# Patient Record
Sex: FEMALE | Race: WHITE | NOT HISPANIC OR LATINO | Employment: OTHER | URBAN - METROPOLITAN AREA
[De-identification: names, ages, dates, MRNs, and addresses within clinical notes are randomized per-mention and may not be internally consistent; named-entity substitution may affect disease eponyms.]

---

## 2017-02-05 ENCOUNTER — HOSPITAL ENCOUNTER (EMERGENCY)
Facility: CLINIC | Age: 42
Discharge: HOME | End: 2017-02-05
Attending: EMERGENCY MEDICINE | Admitting: EMERGENCY MEDICINE
Payer: COMMERCIAL

## 2017-02-05 ENCOUNTER — APPOINTMENT (OUTPATIENT)
Dept: CT IMAGING | Facility: CLINIC | Age: 42
End: 2017-02-05
Attending: EMERGENCY MEDICINE
Payer: COMMERCIAL

## 2017-02-05 VITALS
HEART RATE: 75 BPM | OXYGEN SATURATION: 99 % | RESPIRATION RATE: 18 BRPM | TEMPERATURE: 98.4 F | BODY MASS INDEX: 24.41 KG/M2 | HEIGHT: 64 IN | WEIGHT: 143 LBS | DIASTOLIC BLOOD PRESSURE: 77 MMHG | SYSTOLIC BLOOD PRESSURE: 116 MMHG

## 2017-02-05 DIAGNOSIS — I77.4: ICD-10-CM

## 2017-02-05 DIAGNOSIS — E87.6 HYPOKALEMIA: ICD-10-CM

## 2017-02-05 DIAGNOSIS — R10.84 ABDOMINAL PAIN, GENERALIZED: ICD-10-CM

## 2017-02-05 LAB
ALBUMIN SERPL-MCNC: 3.8 G/DL (ref 3.4–5)
ALP SERPL-CCNC: 129 U/L (ref 40–150)
ALT SERPL W P-5'-P-CCNC: 22 U/L (ref 0–50)
ANION GAP SERPL CALCULATED.3IONS-SCNC: 9 MMOL/L (ref 3–14)
AST SERPL W P-5'-P-CCNC: 13 U/L (ref 0–45)
BASOPHILS # BLD AUTO: 0.1 10E9/L (ref 0–0.2)
BASOPHILS NFR BLD AUTO: 0.6 %
BILIRUB SERPL-MCNC: 0.3 MG/DL (ref 0.2–1.3)
BUN SERPL-MCNC: 25 MG/DL (ref 7–30)
CALCIUM SERPL-MCNC: 9.2 MG/DL (ref 8.5–10.1)
CHLORIDE SERPL-SCNC: 97 MMOL/L (ref 94–109)
CO2 SERPL-SCNC: 30 MMOL/L (ref 20–32)
CREAT SERPL-MCNC: 0.99 MG/DL (ref 0.52–1.04)
DIFFERENTIAL METHOD BLD: ABNORMAL
EOSINOPHIL # BLD AUTO: 0.4 10E9/L (ref 0–0.7)
EOSINOPHIL NFR BLD AUTO: 5.1 %
ERYTHROCYTE [DISTWIDTH] IN BLOOD BY AUTOMATED COUNT: 12 % (ref 10–15)
GFR SERPL CREATININE-BSD FRML MDRD: 62 ML/MIN/1.7M2
GLUCOSE SERPL-MCNC: 112 MG/DL (ref 70–99)
HCG SERPL QL: NEGATIVE
HCT VFR BLD AUTO: 43.7 % (ref 35–47)
HGB BLD-MCNC: 15.4 G/DL (ref 11.7–15.7)
IMM GRANULOCYTES # BLD: 0 10E9/L (ref 0–0.4)
IMM GRANULOCYTES NFR BLD: 0.2 %
LIPASE SERPL-CCNC: 172 U/L (ref 73–393)
LYMPHOCYTES # BLD AUTO: 3 10E9/L (ref 0.8–5.3)
LYMPHOCYTES NFR BLD AUTO: 35.1 %
MCH RBC QN AUTO: 31.2 PG (ref 26.5–33)
MCHC RBC AUTO-ENTMCNC: 35.2 G/DL (ref 31.5–36.5)
MCV RBC AUTO: 89 FL (ref 78–100)
MONOCYTES # BLD AUTO: 0.8 10E9/L (ref 0–1.3)
MONOCYTES NFR BLD AUTO: 9.2 %
NEUTROPHILS # BLD AUTO: 4.3 10E9/L (ref 1.6–8.3)
NEUTROPHILS NFR BLD AUTO: 49.8 %
NRBC # BLD AUTO: 0 10*3/UL
NRBC BLD AUTO-RTO: 0 /100
PLATELET # BLD AUTO: 500 10E9/L (ref 150–450)
POTASSIUM SERPL-SCNC: 2.7 MMOL/L (ref 3.4–5.3)
PROT SERPL-MCNC: 7.9 G/DL (ref 6.8–8.8)
RBC # BLD AUTO: 4.94 10E12/L (ref 3.8–5.2)
SODIUM SERPL-SCNC: 136 MMOL/L (ref 133–144)
WBC # BLD AUTO: 8.6 10E9/L (ref 4–11)

## 2017-02-05 PROCEDURE — 80053 COMPREHEN METABOLIC PANEL: CPT | Performed by: EMERGENCY MEDICINE

## 2017-02-05 PROCEDURE — 25000125 ZZHC RX 250: Performed by: EMERGENCY MEDICINE

## 2017-02-05 PROCEDURE — 99285 EMERGENCY DEPT VISIT HI MDM: CPT | Mod: 25

## 2017-02-05 PROCEDURE — 96361 HYDRATE IV INFUSION ADD-ON: CPT

## 2017-02-05 PROCEDURE — 96376 TX/PRO/DX INJ SAME DRUG ADON: CPT

## 2017-02-05 PROCEDURE — 83690 ASSAY OF LIPASE: CPT | Performed by: EMERGENCY MEDICINE

## 2017-02-05 PROCEDURE — 25500064 ZZH RX 255 OP 636: Performed by: EMERGENCY MEDICINE

## 2017-02-05 PROCEDURE — 25000128 H RX IP 250 OP 636: Performed by: EMERGENCY MEDICINE

## 2017-02-05 PROCEDURE — 84703 CHORIONIC GONADOTROPIN ASSAY: CPT | Performed by: EMERGENCY MEDICINE

## 2017-02-05 PROCEDURE — 96365 THER/PROPH/DIAG IV INF INIT: CPT | Mod: 59

## 2017-02-05 PROCEDURE — 96375 TX/PRO/DX INJ NEW DRUG ADDON: CPT

## 2017-02-05 PROCEDURE — 74177 CT ABD & PELVIS W/CONTRAST: CPT

## 2017-02-05 PROCEDURE — 85025 COMPLETE CBC W/AUTO DIFF WBC: CPT | Performed by: EMERGENCY MEDICINE

## 2017-02-05 RX ORDER — HYDROMORPHONE HYDROCHLORIDE 1 MG/ML
0.5 INJECTION, SOLUTION INTRAMUSCULAR; INTRAVENOUS; SUBCUTANEOUS
Status: DISCONTINUED | OUTPATIENT
Start: 2017-02-05 | End: 2017-02-05 | Stop reason: HOSPADM

## 2017-02-05 RX ORDER — OXYCODONE AND ACETAMINOPHEN 5; 325 MG/1; MG/1
1-2 TABLET ORAL EVERY 4 HOURS PRN
Qty: 25 TABLET | Refills: 0 | Status: ON HOLD | OUTPATIENT
Start: 2017-02-05 | End: 2017-08-02

## 2017-02-05 RX ORDER — ONDANSETRON 2 MG/ML
4 INJECTION INTRAMUSCULAR; INTRAVENOUS EVERY 30 MIN PRN
Status: DISCONTINUED | OUTPATIENT
Start: 2017-02-05 | End: 2017-02-05 | Stop reason: HOSPADM

## 2017-02-05 RX ORDER — SODIUM CHLORIDE 9 MG/ML
1000 INJECTION, SOLUTION INTRAVENOUS CONTINUOUS
Status: DISCONTINUED | OUTPATIENT
Start: 2017-02-05 | End: 2017-02-05 | Stop reason: HOSPADM

## 2017-02-05 RX ORDER — IOPAMIDOL 755 MG/ML
70 INJECTION, SOLUTION INTRAVASCULAR ONCE
Status: COMPLETED | OUTPATIENT
Start: 2017-02-05 | End: 2017-02-05

## 2017-02-05 RX ORDER — ONDANSETRON 4 MG/1
4 TABLET, ORALLY DISINTEGRATING ORAL EVERY 6 HOURS PRN
Qty: 10 TABLET | Refills: 0 | Status: SHIPPED | OUTPATIENT
Start: 2017-02-05 | End: 2017-02-08

## 2017-02-05 RX ADMIN — HYDROMORPHONE HYDROCHLORIDE 0.5 MG: 1 INJECTION, SOLUTION INTRAMUSCULAR; INTRAVENOUS; SUBCUTANEOUS at 04:59

## 2017-02-05 RX ADMIN — SODIUM CHLORIDE 60 ML: 9 INJECTION, SOLUTION INTRAVENOUS at 05:30

## 2017-02-05 RX ADMIN — SODIUM CHLORIDE 1000 ML: 9 INJECTION, SOLUTION INTRAVENOUS at 04:59

## 2017-02-05 RX ADMIN — POTASSIUM CHLORIDE 10 MEQ: 14.9 INJECTION, SOLUTION, CONCENTRATE PARENTERAL at 05:42

## 2017-02-05 RX ADMIN — IOPAMIDOL 70 ML: 755 INJECTION, SOLUTION INTRAVENOUS at 05:30

## 2017-02-05 RX ADMIN — HYDROMORPHONE HYDROCHLORIDE 0.5 MG: 1 INJECTION, SOLUTION INTRAMUSCULAR; INTRAVENOUS; SUBCUTANEOUS at 05:42

## 2017-02-05 RX ADMIN — ONDANSETRON HYDROCHLORIDE 4 MG: 2 SOLUTION INTRAMUSCULAR; INTRAVENOUS at 04:59

## 2017-02-05 ASSESSMENT — ENCOUNTER SYMPTOMS
VOMITING: 1
HEMATURIA: 0
NAUSEA: 1
ABDOMINAL PAIN: 1

## 2017-02-05 NOTE — ED AVS SNAPSHOT
Emergency Department    [address] [facility] [address]    Phone:  [phone]    Fax:  [phone]                                       Chiqui Arias   MRN: [id number]    Department:   Emergency Department   Date of Visit:  2/5/2017           After Visit Summary Signature Page     I have received my discharge instructions, and my questions have been answered. I have discussed any challenges I see with this plan with the nurse or doctor.    ..........................................................................................................................................  Patient/Patient Representative Signature      ..........................................................................................................................................  Patient Representative Print Name and Relationship to Patient    ..................................................               ................................................  Date                                            Time    ..........................................................................................................................................  Reviewed by Signature/Title    ...................................................              ..............................................  Date                                                            Time

## 2017-02-05 NOTE — ED NOTES
Bed: ED01  Expected date: 2/5/17  Expected time: 4:22 AM  Means of arrival: Ambulance  Comments:  411-40F Abd Pain

## 2017-02-05 NOTE — ED AVS SNAPSHOT
Emergency Department    [address] [facility] [address]    Phone:  [phone]    Fax:  [phone]                                       Chiqui Arias   MRN: [id number]    Department:   Emergency Department   Date of Visit:  2/5/2017           Patient Information     Date Of Birth          1975        Your diagnoses for this visit were:     Abdominal pain, generalized     H/o celiac axis compression syndrome (H)     Hypokalemia        You were seen by Wan Gonzales MD.      Follow-up Information     Schedule an appointment as soon as possible for a visit with Reagan Eaton MD.    Specialty:  Surgery    Why:  or with your vascular surgeon    Contact information:    [facility]  [address] [facility] [address]  [facility] [address] [phone]          Schedule an appointment as soon as possible for a visit with Ladan Scanlon PA-C.    Specialty:  Physician Assistant - Medical    Contact information:    [address] [address] [phone]          Follow up with  Emergency Department.    Specialty:  EMERGENCY MEDICINE    Why:  If symptoms worsen    Contact information:    [address] [facility] [address] [phone]        Discharge Instructions       Discharge Instructions  Abdominal Pain    Abdominal pain can be caused by many things. Your evaluation today does not show the exact cause for your pain. Your doctor today has decided that it is unlikely your pain is due to a life threatening problem, or a problem requiring surgery or hospital admission. Sometimes those problems cannot be found right away, so it is very important that you follow up as directed.  Sometimes only the changes which occur over time allow the cause of your pain to be found.    Return to the Emergency Department for a recheck in 8-12 hours if your pain continues.  If your pain gets worse, changes in location, or feels  different, return to the Emergency Department right away.    ADULTS:  Return to the Emergency Department right away if:      You get an oral temperature above 102oF or as directed by your doctor.    You have blood in your stools (bright red or black, tarry stools).    You keep throwing up or can t drink liquids.    You see blood when you throw up.    You can t have a bowel movement or you can t pass gas.    Your stomach gets bloated or bigger.    Your skin or the whites of your eyes look yellow.    You faint.    You have bloody, frequent or painful urination.    You have new symptoms or anything that worries you.    CHILDREN:  Return to the Emergency Department right away if your child has any of the above-listed symptoms or the following:      Pushes your hand away or screams/cries when his/her belly is touched.    You notice your child is very fussy or weak.    Your child is very tired and is too tired to eat or drink.    Your child is dehydrated.  Signs of dehydration can be:  o Your infant has had no wet diapers in 4-5 hours.  o Your older child has not passed urine in 6-8 hours.  o Your infant or child starts to have dry mouth and lips, or no saliva or tears.    PREGNANT WOMEN:  Return to the Emergency Department right away if you have any of the above-listed symptoms or the following:      You have bleeding, leaking fluid or passing tissue from the vagina.    You have worse pain or cramping, or pain in your shoulder or back.    You have vomiting that will not stop.    You have painful or bloody urination.    You have a temperature of 100oF or more.    Your baby is not moving as much as usual.    You faint.    You get a bad headache with or without eye problems and abdominal pain.    You have a convulsion or seizure.    You have unusual discharge from your vagina and abdominal pain.    Abdominal pain is pretty common during pregnancy.  Your pain may or may not be related to your pregnancy. You should follow-up  "closely with your OB doctor so they can evaluate you and your baby.  Until you follow-up with your regular doctor, do the following:       Avoid sex and do not put anything in your vagina.    Drink clear fluids.    Only take medications approved by your doctor.    MORE INFORMATION:    Appendicitis:  A possible cause of abdominal pain in any person who still has their appendix is acute appendicitis. Appendicitis is often hard to diagnose.  Testing does not always rule out early appendicitis or other causes of abdominal pain. Close follow-up with your doctor and re-evaluations may be needed to figure out the reason for your abdominal pain.    Follow-up:  It is very important that you make an appointment with your clinic and go to the appointment.  If you do not follow-up with your primary doctor, it may result in missing an important development which could result in permanent injury or disability and/or lasting pain.  If there is any problem keeping your appointment, call your doctor or return to the Emergency Department.    Medications:  Take your medications as directed by your doctor today.  Before using over-the-counter medications, ask your doctor and make sure to take the medications as directed.  If you have any questions about medications, ask your doctor.    Diet:  Resume your normal diet as much as possible, but do not eat fried, fatty or spicy foods while you have pain.  Do not drink alcohol or have caffeine.  Do not smoke tobacco.    Probiotics: If you have been given an antibiotic, you may want to also take a probiotic pill or eat yogurt with live cultures. Probiotics have \"good bacteria\" to help your intestines stay healthy. Studies have shown that probiotics help prevent diarrhea and other intestine problems (including C. diff infection) when you take antibiotics. You can buy these without a prescription in the pharmacy section of the store.     If you were given a prescription for medicine here today, " be sure to read all of the information (including the package insert) that comes with your prescription.  This will include important information about the medicine, its side effects, and any warnings that you need to know about.  The pharmacist who fills the prescription can provide more information and answer questions you may have about the medicine.  If you have questions or concerns that the pharmacist cannot address, please call or return to the Emergency Department.         Opioid Medication Information    Pain medications are among the most commonly prescribed medicines, so we are including this information for all our patients. If you did not receive pain medication or get a prescription for pain medicine, you can ignore it.     You may have been given a prescription for an opioid (narcotic) pain medicine and/or have received a pain medicine while here in the Emergency Department. These medicines can make you drowsy or impaired. You must not drive, operate dangerous equipment, or engage in any other dangerous activities while taking these medications. If you drive while taking these medications, you could be arrested for DUI, or driving under the influence. Do not drink any alcohol while you are taking these medications.     Opioid pain medications can cause addiction. If you have a history of chemical dependency of any type, you are at a higher risk of becoming addicted to pain medications.  Only take these prescribed medications to treat your pain when all other options have been tried. Take it for as short a time and as few doses as possible. Store your pain pills in a secure place, as they are frequently stolen and provide a dangerous opportunity for children or visitors in your house to start abusing these powerful medications. We will not replace any lost or stolen medicine.  As soon as your pain is better, you should flush all your remaining medication.     Many prescription pain medications contain  Tylenol  (acetaminophen), including Vicodin , Tylenol #3 , Norco , Lortab , and Percocet .  You should not take any extra pills of Tylenol  if you are using these prescription medications or you can get very sick.  Do not ever take more than 3000 mg of acetaminophen in any 24 hour period.    All opioids tend to cause constipation. Drink plenty of water and eat foods that have a lot of fiber, such as fruits, vegetables, prune juice, apple juice and high fiber cereal.  Take a laxative if you don t move your bowels at least every other day. Miralax , Milk of Magnesia, Colace , or Senna  can be used to keep you regular.      Remember that you can always come back to the Emergency Department if you are not able to see your regular doctor in the amount of time listed above, if you get any new symptoms, or if there is anything that worries you.          Hypokalemia  Hypokalemia means a low level of potassium in the blood. This most often occurs in patients who take diuretics (water pills). It can also occur due to severe vomiting or diarrhea.  It is also seen in people who take laxatives for long periods of time.  A mild case usually causes no symptoms. It is only found with blood testing. More severe potassium loss causes generalized weakness, muscle or abdominal cramping, heart palpitations (rapid or irregular heartbeats) and low blood pressure.  Home care    Take any potassium supplements prescribed.    Eat foods rich in potassium. The highest amount is found in artichoke, baked potatoes, spinach, cantaloupe, honeydew melon, cod, halibut, salmon, and scallops. White, red, or collins beans are also very good sources. A modest amount is found in orange juice, bananas, carrots, and tomato juice.    If you take certain types of diuretics, you will also need to take potassium supplements. If take a diuretic, discuss potassium supplements with your doctor.  Follow-up care  Follow up with your healthcare provider for a repeat  blood test within the next week or as advised by our staff.  When to seek medical advice  Call your healthcare provider if any of the following occur:    Increased weakness, fatigue, muscle cramps    Dizziness  Call 911  Call 911 if any of the following occur:    Irregular heartbeat, extra beats or very fast heart rate    Loss of consciousness    3092-0908 [employer] [address] [place] [address]. All rights reserved. This information is not intended as a substitute for professional medical care. Always follow your healthcare professional's instructions.          24 Hour Appointment Hotline       To make an appointment at any [facility] call [phone] ([phone]). If you don't have a family doctor or clinic, we will help you find one. [facility] are conveniently located to serve the needs of you and your family.             Review of your medicines      START taking        Dose / Directions Last dose taken    ondansetron 4 MG ODT tab   Commonly known as:  ZOFRAN ODT   Dose:  4 mg   Quantity:  10 tablet        Take 1 tablet (4 mg) by mouth every 6 hours as needed for nausea   Refills:  0        oxyCODONE-acetaminophen 5-325 MG per tablet   Commonly known as:  PERCOCET   Dose:  1-2 tablet   Quantity:  25 tablet        Take 1-2 tablets by mouth every 4 hours as needed for pain   Refills:  0          Our records show that you are taking the medicines listed below. If these are incorrect, please call your family doctor or clinic.        Dose / Directions Last dose taken    NO ACTIVE MEDICATIONS        Refills:  0                Prescriptions were sent or printed at these locations (2 Prescriptions)                   Other Prescriptions                Printed at Department/Unit printer (2 of 2)         oxyCODONE-acetaminophen (PERCOCET) 5-325 MG per tablet               ondansetron (ZOFRAN ODT) 4 MG ODT tab                Procedures and tests performed during your visit      CBC with platelets + differential    CT Abdomen Pelvis w Contrast    Comprehensive metabolic panel    HCG qualitative    Lipase      Orders Needing Specimen Collection     None      Pending Results     No orders found from 2/4/2017 to 2/6/2017.            Pending Culture Results     No orders found from 2/4/2017 to 2/6/2017.       Test Results from your hospital stay           2/5/2017  4:49 AM - Interface, Flexilab Results      Component Results     Component Value Ref Range & Units Status    WBC 8.6 4.0 - 11.0 10e9/L Final    RBC Count 4.94 3.8 - 5.2 10e12/L Final    Hemoglobin 15.4 11.7 - 15.7 g/dL Final    Hematocrit 43.7 35.0 - 47.0 % Final    MCV 89 78 - 100 fl Final    MCH 31.2 26.5 - 33.0 pg Final    MCHC 35.2 31.5 - 36.5 g/dL Final    RDW 12.0 10.0 - 15.0 % Final    Platelet Count 500 (H) 150 - 450 10e9/L Final    Diff Method Automated Method  Final    % Neutrophils 49.8 % Final    % Lymphocytes 35.1 % Final    % Monocytes 9.2 % Final    % Eosinophils 5.1 % Final    % Basophils 0.6 % Final    % Immature Granulocytes 0.2 % Final    Nucleated RBCs 0 0 /100 Final    Absolute Neutrophil 4.3 1.6 - 8.3 10e9/L Final    Absolute Lymphocytes 3.0 0.8 - 5.3 10e9/L Final    Absolute Monocytes 0.8 0.0 - 1.3 10e9/L Final    Absolute Eosinophils 0.4 0.0 - 0.7 10e9/L Final    Absolute Basophils 0.1 0.0 - 0.2 10e9/L Final    Abs Immature Granulocytes 0.0 0 - 0.4 10e9/L Final    Absolute Nucleated RBC 0.0  Final         2/5/2017  5:11 AM - Interface, Flexilab Results      Component Results     Component Value Ref Range & Units Status    Sodium 136 133 - 144 mmol/L Final    Potassium 2.7 (L) 3.4 - 5.3 mmol/L Final    Chloride 97 94 - 109 mmol/L Final    Carbon Dioxide 30 20 - 32 mmol/L Final    Anion Gap 9 3 - 14 mmol/L Final    Glucose 112 (H) 70 - 99 mg/dL Final    Urea Nitrogen 25 7 - 30 mg/dL Final    Creatinine 0.99 0.52 - 1.04 mg/dL Final    GFR Estimate 62 >60 mL/min/1.7m2 Final    Non  GFR Calc     GFR Estimate If Black 75 >60 mL/min/1.7m2 Final    African American GFR Calc    Calcium 9.2 8.5 - 10.1 mg/dL Final    Bilirubin Total 0.3 0.2 - 1.3 mg/dL Final    Albumin 3.8 3.4 - 5.0 g/dL Final    Protein Total 7.9 6.8 - 8.8 g/dL Final    Alkaline Phosphatase 129 40 - 150 U/L Final    ALT 22 0 - 50 U/L Final    AST 13 0 - 45 U/L Final         2/5/2017  5:06 AM - Interface, Flexilab Results      Component Results     Component Value Ref Range & Units Status    HCG Qualitative Serum Negative NEG Final         2/5/2017  5:48 AM - Interface, Flexilab Results      Component Results     Component Value Ref Range & Units Status    Lipase 172 73 - 393 U/L Final         2/5/2017  6:48 AM - Interface, Radiant Ib      Narrative     CT ABDOMEN PELVIS WITH CONTRAST   2/5/2017 5:34 AM     HISTORY: Abdominal pain.    TECHNIQUE: 70mL Isovue-370 IV were administered. After contrast  administration, volumetric helical sections were acquired from the  lung bases to the ischial tuberosities. Coronal images were also  reconstructed. Radiation dose for this scan was reduced using  automated exposure control, adjustment of the mA and/or kV according  to patient size, or iterative reconstruction technique.    COMPARISON: None.     FINDINGS: No bowel obstruction. No evidence for colitis or  diverticulitis. No free fluid in the pelvis.  The liver, gallbladder,  spleen, adrenal glands, pancreas, and kidneys are unremarkable. No  hydronephrosis. No enlarged lymph nodes are identified in the abdomen  or pelvis. The appendix is not visualized, consistent with history of  prior appendectomy. The lung bases are clear.        Impression     IMPRESSION: No acute abnormality in the abdomen or pelvis. No cause  for abdominal pain is identified.    LUIS ARMANDO WHITE MD                Clinical Quality Measure: Blood Pressure Screening     Your blood pressure was checked while you were in the emergency department today. The last reading we obtained was   BP: 116/77 mmHg . Please read the guidelines below about what these numbers mean and what you should do about them.  If your systolic blood pressure (the top number) is less than 120 and your diastolic blood pressure (the bottom number) is less than 80, then your blood pressure is normal. There is nothing more that you need to do about it.  If your systolic blood pressure (the top number) is 120-139 or your diastolic blood pressure (the bottom number) is 80-89, your blood pressure may be higher than it should be. You should have your blood pressure rechecked within a year by a primary care provider.  If your systolic blood pressure (the top number) is 140 or greater or your diastolic blood pressure (the bottom number) is 90 or greater, you may have high blood pressure. High blood pressure is treatable, but if left untreated over time it can put you at risk for heart attack, stroke, or kidney failure. You should have your blood pressure rechecked by a primary care provider within the next 4 weeks.  If your provider in the emergency department today gave you specific instructions to follow-up with your doctor or provider even sooner than that, you should follow that instruction and not wait for up to 4 weeks for your follow-up visit.        Thank you for choosing [facility]       Thank you for choosing [facility] for your care. Our goal is always to provide you with excellent care. Hearing back from our patients is one way we can continue to improve our services. Please take a few minutes to complete the written survey that you may receive in the mail after you visit with us. Thank you!        [facility] Information     [employer] gives you secure access to your electronic health record. If you see a primary care provider, you can also send messages to your care team and make appointments. If you have questions, please call your primary care clinic.  If you do not have a primary care provider, please call [phone] and they  will assist you.        Care EveryWhere ID     This is your Care EveryWhere ID. This could be used by other organizations to access your [facility] medical records  [id number]        After Visit Summary       This is your record. Keep this with you and show to your community pharmacist(s) and doctor(s) at your next visit.

## 2017-02-05 NOTE — DISCHARGE INSTRUCTIONS
Discharge Instructions  Abdominal Pain    Abdominal pain can be caused by many things. Your evaluation today does not show the exact cause for your pain. Your doctor today has decided that it is unlikely your pain is due to a life threatening problem, or a problem requiring surgery or hospital admission. Sometimes those problems cannot be found right away, so it is very important that you follow up as directed.  Sometimes only the changes which occur over time allow the cause of your pain to be found.    Return to the Emergency Department for a recheck in 8-12 hours if your pain continues.  If your pain gets worse, changes in location, or feels different, return to the Emergency Department right away.    ADULTS:  Return to the Emergency Department right away if:      You get an oral temperature above 102oF or as directed by your doctor.    You have blood in your stools (bright red or black, tarry stools).    You keep throwing up or can t drink liquids.    You see blood when you throw up.    You can t have a bowel movement or you can t pass gas.    Your stomach gets bloated or bigger.    Your skin or the whites of your eyes look yellow.    You faint.    You have bloody, frequent or painful urination.    You have new symptoms or anything that worries you.    CHILDREN:  Return to the Emergency Department right away if your child has any of the above-listed symptoms or the following:      Pushes your hand away or screams/cries when his/her belly is touched.    You notice your child is very fussy or weak.    Your child is very tired and is too tired to eat or drink.    Your child is dehydrated.  Signs of dehydration can be:  o Your infant has had no wet diapers in 4-5 hours.  o Your older child has not passed urine in 6-8 hours.  o Your infant or child starts to have dry mouth and lips, or no saliva or tears.    PREGNANT WOMEN:  Return to the Emergency Department right away if you have any of the above-listed symptoms or  the following:      You have bleeding, leaking fluid or passing tissue from the vagina.    You have worse pain or cramping, or pain in your shoulder or back.    You have vomiting that will not stop.    You have painful or bloody urination.    You have a temperature of 100oF or more.    Your baby is not moving as much as usual.    You faint.    You get a bad headache with or without eye problems and abdominal pain.    You have a convulsion or seizure.    You have unusual discharge from your vagina and abdominal pain.    Abdominal pain is pretty common during pregnancy.  Your pain may or may not be related to your pregnancy. You should follow-up closely with your OB doctor so they can evaluate you and your baby.  Until you follow-up with your regular doctor, do the following:       Avoid sex and do not put anything in your vagina.    Drink clear fluids.    Only take medications approved by your doctor.    MORE INFORMATION:    Appendicitis:  A possible cause of abdominal pain in any person who still has their appendix is acute appendicitis. Appendicitis is often hard to diagnose.  Testing does not always rule out early appendicitis or other causes of abdominal pain. Close follow-up with your doctor and re-evaluations may be needed to figure out the reason for your abdominal pain.    Follow-up:  It is very important that you make an appointment with your clinic and go to the appointment.  If you do not follow-up with your primary doctor, it may result in missing an important development which could result in permanent injury or disability and/or lasting pain.  If there is any problem keeping your appointment, call your doctor or return to the Emergency Department.    Medications:  Take your medications as directed by your doctor today.  Before using over-the-counter medications, ask your doctor and make sure to take the medications as directed.  If you have any questions about medications, ask your doctor.    Diet:   "Resume your normal diet as much as possible, but do not eat fried, fatty or spicy foods while you have pain.  Do not drink alcohol or have caffeine.  Do not smoke tobacco.    Probiotics: If you have been given an antibiotic, you may want to also take a probiotic pill or eat yogurt with live cultures. Probiotics have \"good bacteria\" to help your intestines stay healthy. Studies have shown that probiotics help prevent diarrhea and other intestine problems (including C. diff infection) when you take antibiotics. You can buy these without a prescription in the pharmacy section of the store.     If you were given a prescription for medicine here today, be sure to read all of the information (including the package insert) that comes with your prescription.  This will include important information about the medicine, its side effects, and any warnings that you need to know about.  The pharmacist who fills the prescription can provide more information and answer questions you may have about the medicine.  If you have questions or concerns that the pharmacist cannot address, please call or return to the Emergency Department.         Opioid Medication Information    Pain medications are among the most commonly prescribed medicines, so we are including this information for all our patients. If you did not receive pain medication or get a prescription for pain medicine, you can ignore it.     You may have been given a prescription for an opioid (narcotic) pain medicine and/or have received a pain medicine while here in the Emergency Department. These medicines can make you drowsy or impaired. You must not drive, operate dangerous equipment, or engage in any other dangerous activities while taking these medications. If you drive while taking these medications, you could be arrested for DUI, or driving under the influence. Do not drink any alcohol while you are taking these medications.     Opioid pain medications can cause " addiction. If you have a history of chemical dependency of any type, you are at a higher risk of becoming addicted to pain medications.  Only take these prescribed medications to treat your pain when all other options have been tried. Take it for as short a time and as few doses as possible. Store your pain pills in a secure place, as they are frequently stolen and provide a dangerous opportunity for children or visitors in your house to start abusing these powerful medications. We will not replace any lost or stolen medicine.  As soon as your pain is better, you should flush all your remaining medication.     Many prescription pain medications contain Tylenol  (acetaminophen), including Vicodin , Tylenol #3 , Norco , Lortab , and Percocet .  You should not take any extra pills of Tylenol  if you are using these prescription medications or you can get very sick.  Do not ever take more than 3000 mg of acetaminophen in any 24 hour period.    All opioids tend to cause constipation. Drink plenty of water and eat foods that have a lot of fiber, such as fruits, vegetables, prune juice, apple juice and high fiber cereal.  Take a laxative if you don t move your bowels at least every other day. Miralax , Milk of Magnesia, Colace , or Senna  can be used to keep you regular.      Remember that you can always come back to the Emergency Department if you are not able to see your regular doctor in the amount of time listed above, if you get any new symptoms, or if there is anything that worries you.          Hypokalemia  Hypokalemia means a low level of potassium in the blood. This most often occurs in patients who take diuretics (water pills). It can also occur due to severe vomiting or diarrhea.  It is also seen in people who take laxatives for long periods of time.  A mild case usually causes no symptoms. It is only found with blood testing. More severe potassium loss causes generalized weakness, muscle or abdominal cramping,  heart palpitations (rapid or irregular heartbeats) and low blood pressure.  Home care    Take any potassium supplements prescribed.    Eat foods rich in potassium. The highest amount is found in artichoke, baked potatoes, spinach, cantaloupe, honeydew melon, cod, halibut, salmon, and scallops. White, red, or collins beans are also very good sources. A modest amount is found in orange juice, bananas, carrots, and tomato juice.    If you take certain types of diuretics, you will also need to take potassium supplements. If take a diuretic, discuss potassium supplements with your doctor.  Follow-up care  Follow up with your healthcare provider for a repeat blood test within the next week or as advised by our staff.  When to seek medical advice  Call your healthcare provider if any of the following occur:    Increased weakness, fatigue, muscle cramps    Dizziness  Call 911  Call 911 if any of the following occur:    Irregular heartbeat, extra beats or very fast heart rate    Loss of consciousness    6482-6594 [employer]. [address] [place] [address]. All rights reserved. This information is not intended as a substitute for professional medical care. Always follow your healthcare professional's instructions.

## 2017-02-05 NOTE — ED PROVIDER NOTES
"  History     Chief Complaint:  Abdominal Pain      HPI   Chiqui Arias is a 41 year old female who presents to the emergency department today for evaluation of abdominal pain. The patient states that she has \"tearing\" epigastric abdominal pain accompanied with nausea and vomiting that started last night. She notes that she had similar pain last summer and was subsequently admitted for \"celiac stenosis of the artery.\" She denies hematuria, fevers, dark or tarry stools, or any other complaints.      Allergies:  Morphine       Medications:    The patient is currently on no regular medications.     Past Medical History:    Chlamydia  Celiac stenosis artery     Past Surgical History:    Appendectomy      Family History:    Mother: Thyroid disease  Father: Heart disease, Melanoma   Paternal Grandmother: Alzheimer's disease      Social History:  The patient presents alone.  Smoking Status: Current every day smoker-- 1.00 packs/day  Smokeless Tobacco: Never used  Alcohol Use: Yes  Marital Status:  Single [1]    Review of Systems   Gastrointestinal: Positive for nausea, vomiting and abdominal pain.        Negative for dark or tarry stool     Genitourinary: Negative for hematuria.   All other systems reviewed and are negative.    Physical Exam   Vitals  Patient Vitals for the past 24 hrs:   BP Temp Temp src Pulse Resp SpO2 Height Weight   02/05/17 0433 (!) 140/104 mmHg 98.4  F (36.9  C) Oral 87 18 98 % 1.619 m (5' 3.75\") 64.864 kg (143 lb)        Physical Exam  Nursing note and vitals reviewed.  Constitutional:  Oriented to person, place, and time. Cooperative.   HENT:   Nose:    Nose normal.   Mouth/Throat:   Mucous membranes are normal.   Eyes:    Conjunctivae normal and EOM are normal.      Pupils are equal, round, and reactive to light.   Neck:    Trachea normal.   Cardiovascular:  Normal rate, regular rhythm, normal heart sounds and normal pulses. No murmur heard.  Pulmonary/Chest:  Effort normal and breath " sounds normal.   Abdominal:   Soft. Normal appearance and bowel sounds are normal.      There is diffuse tenderness to palpation.     There is no rebound and no CVA tenderness.   Musculoskeletal:  Extremities atraumatic x 4.   Lymphadenopathy:  No cervical adenopathy.   Neurological:   Alert and oriented to person, place, and time. Normal strength.      No cranial nerve deficit or sensory deficit. GCS eye subscore is 4. GCS verbal subscore is 5. GCS motor subscore is 6.   Skin:    Skin is intact. No rash noted.   Psychiatric:   Normal mood and affect.    Emergency Department Course   Imaging:  Radiology findings were communicated with the patient who voiced understanding of the findings.    CT Abdomen Pelvis w/ Contrast:  No acute abnormality in the abdomen or pelvis. No cause  for abdominal pain is identified.  Reading per radiology    Laboratory:  Laboratory findings were communicated with the patient who voiced understanding of the findings.    CBC: PLT: 500(H) o/w WNL. (WBC 8.6, HGB 15.4)     CMP: Potassium: 2.7(L), Glucose: 112(H) WNL (Creatinine: 0.99)    HCG qualitative: Negative     Lipase: 172    Interventions:  0459- NS 1000 mL IV  0459- Dilaudid 0.5 mg IV  0542- Dilaudid 0.5 mg IV  0542- potassium chloride 10 mEq IV      Emergency Department Course:  Nursing notes and vitals reviewed.  I performed an exam of the patient as documented above.     IV was inserted and blood was drawn for laboratory testing, results above.    The patient was sent for a CT while in the emergency department, results above.     I discussed the treatment plan with the patient. They expressed understanding of this plan and discharge.    I personally reviewed the laboratory results with the Patient and answered all related questions prior to discharge.    Impression & Plan      Medical Decision Making:  This is a 41 year old female who came in with abdominal pain, nausea, and vomiting. 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 She indicates that her pain today feels similar to the pain she had when she was admitted at [facility] in August of 2015 when that was found. She is being followed by vascular surgery there and they feel that nothing needs to be done. She does not have peritoneal signs and has a relatively normal exam other than some mild diffuse tenderness to palpation. I obtained the above work up here including the blood work and CT scan. While I did not obtain an angiogram of her abdomen, she does not appear to have any significant findings today. Her pain very well could be secondary to the compression though, but she has no worrisome findings for ischemic bowel. Her potassium is on the low side but that is not new for her. We replaced that here as well. I  discussed staying in the hospital for further evaluation and management vs going home. She actually prefers to go home, which I think that is reasonable. However I recommended following up with her vascular surgeon or the one I am providing for her as soon as possible. I will send her home with prescriptions for Percocet and Zofran as well. She should return with increasing pain, vomiting, fevers, or other concerns.         Diagnosis:    ICD-10-CM    1. Abdominal pain, generalized R10.84    2. 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 Hypokalemia E87.6          Disposition:   The patient was discharged.    Discharge Medications:  Discharge Medication List as of 2/5/2017  6:59 AM      START taking these medications    Details   oxyCODONE-acetaminophen (PERCOCET) 5-325 MG per tablet Take 1-2 tablets by mouth every 4 hours as needed for pain, Disp-25 tablet, R-0, Local Print      ondansetron (ZOFRAN ODT) 4 MG ODT tab Take 1 tablet (4 mg) by mouth every 6 hours as needed for nausea, Disp-10 tablet, R-0, Local Print             Scribe  Disclosure:  I, Marlon Núñez, am serving as a scribe at 4:53 AM on 2/5/2017 to document services personally performed by Wan Gonzales MD, based on my observations and the provider's statements to me.    2/5/2017    EMERGENCY DEPARTMENT        Wan Gonzales MD  02/06/17 0331

## 2017-02-22 ENCOUNTER — TELEPHONE (OUTPATIENT)
Dept: OTHER | Facility: CLINIC | Age: 42
End: 2017-02-22

## 2017-02-22 NOTE — TELEPHONE ENCOUNTER
"Late charting. Pt called  and setup OV with Dr. Eaton for 2-23-17.     Pt referred to [facility] by Dr. Gonzales for celiac axis syndrome. (MALS)    ED notes and CT ABDOMEN PELVIS WITH CONTRAST 2/5/2017 in [facility].     Pt seen in ED at [facility] on 2-5-17, Dr. Gonzales notes   \"This is a 41 year old female who came in with abdominal pain, nausea, and vomiting. 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 She indicates that her pain today feels similar to the pain she had when she was admitted at [facility] in August of 2015 when that was found. She is being followed by vascular surgery there and they feel that nothing needs to be done. She does not have peritoneal signs and has a relatively normal exam other than some mild diffuse tenderness to palpation. I obtained the above work up here including the blood work and CT scan. While I did not obtain an angiogram of her abdomen, she does not appear to have any significant findings today. Her pain very well could be secondary to the compression though, but she has no worrisome findings for ischemic bowel. Her potassium is on the low side but that is not new for her. We replaced that here as well. I discussed staying in the hospital for further evaluation and management vs going home. She actually prefers to go home, which I think that is reasonable. However I recommended following up with her vascular surgeon or the one I am providing for her as soon as possible. I will send her home with prescriptions for Percocet and Zofran as well. She should return with increasing pain, vomiting, fevers, or other concerns.\"     Jodi Moreira, PRANEETH, BSN.       "

## 2017-02-23 ENCOUNTER — TELEPHONE (OUTPATIENT)
Dept: OTHER | Facility: CLINIC | Age: 42
End: 2017-02-23

## 2017-02-23 NOTE — TELEPHONE ENCOUNTER
Tried calling pt to inform her that pt missed their appt today with Dr. Eaton but pt's only phone number listed says its unavailable and/or has been disconnected.

## 2017-03-23 ENCOUNTER — OFFICE VISIT (OUTPATIENT)
Dept: OTHER | Facility: CLINIC | Age: 42
End: 2017-03-23
Attending: SURGERY
Payer: COMMERCIAL

## 2017-03-23 VITALS
DIASTOLIC BLOOD PRESSURE: 88 MMHG | WEIGHT: 144 LBS | BODY MASS INDEX: 24.59 KG/M2 | SYSTOLIC BLOOD PRESSURE: 126 MMHG | HEIGHT: 64 IN | HEART RATE: 77 BPM

## 2017-03-23 DIAGNOSIS — I77.4 CELIAC ARTERY COMPRESSION SYNDROME (H): Primary | ICD-10-CM

## 2017-03-23 PROCEDURE — 99203 OFFICE O/P NEW LOW 30 MIN: CPT | Mod: ZP | Performed by: SURGERY

## 2017-03-23 PROCEDURE — 99211 OFF/OP EST MAY X REQ PHY/QHP: CPT

## 2017-03-23 RX ORDER — CHLORTHALIDONE 25 MG/1
25 TABLET ORAL DAILY
COMMUNITY
Start: 2017-01-21

## 2017-03-23 NOTE — NURSING NOTE
"Chief Complaint   Patient presents with     Consult     Pt seen in ED for celiac axis syndrome        Initial /88 (BP Location: Right arm, Patient Position: Chair, Cuff Size: Adult Regular)  Pulse 77  Ht 5' 4\" (1.626 m)  Wt 144 lb (65.3 kg)  BMI 24.72 kg/m2 Estimated body mass index is 24.72 kg/(m^2) as calculated from the following:    Height as of this encounter: 5' 4\" (1.626 m).    Weight as of this encounter: 144 lb (65.3 kg).  Medication Reconciliation: {Medication Reconciliation:[id number]}  "
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
## 2017-03-23 NOTE — NURSING NOTE
"Chief Complaint   Patient presents with     Consult     Pt seen in ED for celiac axis syndrome        Initial /84 (BP Location: Left arm, Patient Position: Chair, Cuff Size: Adult Regular)  Pulse 80  Ht 5' 4\" (1.626 m)  Wt 144 lb (65.3 kg)  BMI 24.72 kg/m2 Estimated body mass index is 24.72 kg/(m^2) as calculated from the following:    Height as of this encounter: 5' 4\" (1.626 m).    Weight as of this encounter: 144 lb (65.3 kg).  Medication Reconciliation: complete     Face to face nursing time: 8 minutes    Kaykay Muñiz MA     "

## 2017-03-23 NOTE — MR AVS SNAPSHOT
"              After Visit Summary   3/23/2017    Chiqui Arias    MRN: [id number]           Patient Information     Date Of Birth          1975        Visit Information        Provider Department      3/23/2017 3:00 PM Reagan Eaton MD [facility]      Today's Diagnoses     Celiac artery compression syndrome (H)    -  1       Follow-ups after your visit        Who to contact     If you have questions or need follow up information about today's clinic visit or your schedule please contact [facility] directly at [phone].  Normal or non-critical lab and imaging results will be communicated to you by MyChart, letter or phone within 4 business days after the clinic has received the results. If you do not hear from us within 7 days, please contact the clinic through [employer] or phone. If you have a critical or abnormal lab result, we will notify you by phone as soon as possible.  Submit refill requests through [employer] or call your pharmacy and they will forward the refill request to us. Please allow 3 business days for your refill to be completed.          Additional Information About Your Visit        MyChart Information     [employer] gives you secure access to your electronic health record. If you see a primary care provider, you can also send messages to your care team and make appointments. If you have questions, please call your primary care clinic.  If you do not have a primary care provider, please call [phone] and they will assist you.        Care EveryWhere ID     This is your Care EveryWhere ID. This could be used by other organizations to access your [facility] medical records  [id number]        Your Vitals Were     Pulse Height Breastfeeding? BMI (Body Mass Index)          77 5' 4\" (1.626 m) No 24.72 kg/m2         Blood Pressure from Last 3 Encounters:   03/23/17 126/88   02/05/17 116/77 "   10/19/12 126/84    Weight from Last 3 Encounters:   03/23/17 144 lb (65.3 kg)   02/05/17 143 lb (64.9 kg)   10/19/12 144 lb 12.8 oz (65.7 kg)              Today, you had the following     No orders found for display       Primary Care Provider Office Phone # Fax #    Ladan Scanlon PA-C [phone]       [facility] [address] [facility] [place] [address]        Thank you!     Thank you for choosing [facility] [place]  for your care. Our goal is always to provide you with excellent care. Hearing back from our patients is one way we can continue to improve our services. Please take a few minutes to complete the written survey that you may receive in the mail after your visit with us. Thank you!             Your Updated Medication List - Protect others around you: Learn how to safely use, store and throw away your medicines at www.disposemymeds.org.          This list is accurate as of: 3/23/17  6:26 PM.  Always use your most recent med list.                   Brand Name Dispense Instructions for use    chlorthalidone 25 MG tablet    HYGROTON     Take 25 mg by mouth       NO ACTIVE MEDICATIONS          oxyCODONE-acetaminophen 5-325 MG per tablet    PERCOCET    25 tablet    Take 1-2 tablets by mouth every 4 hours as needed for pain

## 2017-03-24 DIAGNOSIS — I77.4 CELIAC ARTERY COMPRESSION SYNDROME (H): Primary | ICD-10-CM

## 2017-03-29 ENCOUNTER — TELEPHONE (OUTPATIENT)
Dept: OTHER | Facility: CLINIC | Age: 42
End: 2017-03-29

## 2017-03-29 NOTE — TELEPHONE ENCOUNTER
I called and left a message with Chiqui SIBLEY Nallelyfabiana.  We saw her recently for abdominal pain associated with bicycling.  She had a previous CTA scan at [facility] two years ago implying that she had median arcuate ligament syndrome with compression basilic artery and possibly the SMA.      We did receive the information reviewed the films.  This does show significant compression of the proximal celiac artery with poststenotic dilatation.  There may also be some mild compression of the SMA. I left a message that we had received and reviewed the CTA results and films.      She has very unusual symptoms  For this syndrome which is usually associated with postprandial abdominal pain and loose bowels.  However, she does certainly have significant compression of the celiac artery that could lead in the future if untreated to scar tissue and a chronic problem.  She is scheduled for noninvasive duplex evaluation of celiac artery with inspiration and expiration this coming week.  Once he's tests are available we'll make further recommendations at this time I'm leaning towards an open division of the median arcuate ligament and mobilization of celiac and SMA arteries.      Reagan Eaton MD

## 2017-04-03 ENCOUNTER — HOSPITAL ENCOUNTER (OUTPATIENT)
Dept: ULTRASOUND IMAGING | Facility: CLINIC | Age: 42
Discharge: HOME | End: 2017-04-03
Attending: SURGERY | Admitting: SURGERY
Payer: COMMERCIAL

## 2017-04-03 DIAGNOSIS — I77.4 CELIAC ARTERY COMPRESSION SYNDROME (H): ICD-10-CM

## 2017-04-03 PROCEDURE — 93976 VASCULAR STUDY: CPT

## 2017-04-10 ENCOUNTER — TELEPHONE (OUTPATIENT)
Dept: OTHER | Facility: CLINIC | Age: 42
End: 2017-04-10

## 2017-04-10 NOTE — TELEPHONE ENCOUNTER
I tried to call Chiqui Arias And left the brief message on her home answering machine.  She had seen me recently for epigastric pain.  She had a previous diagnosis of compression ot the upper  Proximal celiac artery and SMA artery from a CTA from [facility]  Indicative of MALS.    Her symptoms are somewhat atypical since this is not associated with eating but more exercise such as riding her bike to and from work.  I was able to obtain the CTA disc and review these findings which are consistent with MALS primarily involving the celiac artery and to a slight degree the SMA which are otherwise completely normal.    We performed a dynamic duplex ultrasound which confirmed compression of these arteries with very little respiratory variation.  This implies that the median arcuate ligament may have a chronic compression of the artery and not just with respiration which is oftentimes the situation.      With these findings open surgical decompression and division of the arcuate ligament would be the only treatment option.  I left a message for her to call us to discuss this further.       Reagan Eaton MD

## 2017-04-12 ENCOUNTER — TELEPHONE (OUTPATIENT)
Dept: OTHER | Facility: CLINIC | Age: 42
End: 2017-04-12

## 2017-04-12 NOTE — TELEPHONE ENCOUNTER
I tried to reach Chiqui Arias  Multiple times on her phone which is the only available contact.  We have left messages in the past to call us.  Presently her phone number is not accepting any messages.         Reagan Eaton MD

## 2017-04-13 ENCOUNTER — TELEPHONE (OUTPATIENT)
Dept: OTHER | Facility: CLINIC | Age: 42
End: 2017-04-13

## 2017-04-13 NOTE — TELEPHONE ENCOUNTER
Chiqui SIBLEY Juana Did call back in speak with me today after we had left several messages.  She has evidence of significant fixed compression of the celiac artery and some degree of the SMA it appears to be very chronic and noticed several years ago a CTA.  This is not affected by respiratory changes on a recent duplex ultrasound.  This likely would explain the discomfort she is having chronically her upper abdomen particularly with exercise below this is an atypical finding.    This probably will not resolve with any conservative treatment.  I'm also concerned that ongoing long-term compression particularly this iliac artery may lead to fibrosis and scarring of the artery may be permanent and not relieved with release of the median arcuate ligament extrinsic compression.    I does feel that she would benefit from a open mobilization of the celiac and SMA arteries with division of median arcuate ligament.  If a fixed obstruction is noted we would consider an interoperative bypass from the aorta to the normal celiac artery. This would unlikely be the situation for the SMA considering the prior CTA views.    I have discussed this with her today about the surgical procedure and recovery.  She wants to discuss this with her mother before making a final decision.  She will call Andreina Moore my scheduling staff to schedule surgery.       Reagan Eaton MD

## 2017-04-18 ENCOUNTER — TELEPHONE (OUTPATIENT)
Dept: OTHER | Facility: CLINIC | Age: 42
End: 2017-04-18

## 2017-05-04 ENCOUNTER — OFFICE VISIT (OUTPATIENT)
Dept: OTHER | Facility: CLINIC | Age: 42
End: 2017-05-04
Attending: SURGERY
Payer: COMMERCIAL

## 2017-05-04 VITALS — DIASTOLIC BLOOD PRESSURE: 89 MMHG | SYSTOLIC BLOOD PRESSURE: 125 MMHG | HEART RATE: 97 BPM

## 2017-05-04 DIAGNOSIS — I77.4 MEDIAN ARCUATE LIGAMENT SYNDROME (H): Primary | ICD-10-CM

## 2017-05-04 PROCEDURE — 99213 OFFICE O/P EST LOW 20 MIN: CPT | Mod: ZP | Performed by: SURGERY

## 2017-05-04 PROCEDURE — 99211 OFF/OP EST MAY X REQ PHY/QHP: CPT

## 2017-05-04 NOTE — LETTER
[facility] [place] [address]  [place] [address]  Phone: [phone]  Fax: [phone]      May 4, 2017      RE:  Chiqui Arias-:  77    Chiqui Arias came in today for reevaluation. She has a long history of abdominal discomfort. This can be associated with activities. Occasionally this is associated with eating meals but less so with smaller portions. She can have a pain even with no activity or eating.      We were able to review a CT scan from [facility] performed on 2015. This revealed significant compression of the proximal celiac artery and even of superior mesenteric artery (total left her degree). On the celiac artery poststenotic dilatation was appreciated. No other vascular problems were appreciated in the aorta-renal arteries.     Though her symptoms are very vague and not classic for median arcuate ligament syndrome her CT findings are very dramatic. On our duplex evaluation it was no respiratory variation implying the compression was constant. I am concerned that if this is not corrected she will develop if she has not already a permanent narrowing upper celiac artery and possibly using the superior mesenteric artery which could lead to potential intestinal ischemia symptoms.     With these findings would recommend she undergo surgical treatment we discussed the operation with her today. This would be an open procedure with division of the median arcuate ligament and mobilization of the celiac and superior mesenteric arteries. We decided the time of surgery if there is evidence of ongoing compression of the celiac artery whether a bypass should be performed or a later interventional angioplasty.     I also discussed with her on our 20 minute visit today that with her somewhat unusual symptoms I cannot assure her that all will resolve. But as mentioned with the due to previous stenosis nonoperative treatment could put her at  further vascular risk and she ages. She does understand we'll schedule the surgery at her convenience within expected several day hospital stay.     Reagan Eaton MD

## 2017-05-04 NOTE — PROGRESS NOTES
Chiqui Arias came in today for reevaluation.  She has a long history of abdominal discomfort.  This can be associated with activities.  Occasionally this is associated with eating meals but less so with smaller portions.  She can have a pain even with no activity or eating.      We were able to review a CT scan from [facility] performed on 8/11/2015.  This revealed significant compression of the proximal celiac artery and even of superior mesenteric artery (total left her degree).  On the celiac artery poststenotic dilatation was appreciated.  No other vascular problems were appreciated in the aorta-renal arteries.      Though her symptoms are very vague and not classic for median arcuate ligament syndrome her CT findings are very dramatic.  On our duplex evaluation it was no respiratory variation implying the compression was constant.   I am concerned that if this is not corrected she will develop if she has not already a permanent narrowing upper celiac artery and possibly using the superior mesenteric artery which could lead to potential intestinal ischemia symptoms.      With these findings would recommend she undergo surgical treatment we discussed the operation with her today.  This would be an open procedure with division of the median arcuate ligament and mobilization of the celiac and superior mesenteric arteries.  We decided the time of surgery if there is evidence of ongoing compression of the celiac artery whether a bypass should be performed or a later interventional angioplasty.    I also discussed with her on our 20 minute visit today that with her somewhat unusual symptoms I cannot assure her that all will resolve.  But as mentioned with the due to previous stenosis nonoperative treatment could put her at further vascular risk and she ages.  She does understand we'll schedule the surgery at her convenience within expected several day hospital stay.       Reagan Eaton MD      Please route or send letter to:  Primary Care Provider (PCP)

## 2017-05-04 NOTE — NURSING NOTE
"Chief Complaint   Patient presents with     RECHECK     Discuss possible surgery (Division of the median arcuate ligament and mobilization of celiac and SMA arteries; possible aorto-celiac bypass). [facility] 04/18/17       Initial /89 (BP Location: Left arm, Patient Position: Chair, Cuff Size: Adult Regular)  Pulse 97  Breastfeeding? No Estimated body mass index is 24.72 kg/(m^2) as calculated from the following:    Height as of 3/23/17: 5' 4\" (1.626 m).    Weight as of 3/23/17: 144 lb (65.3 kg).  Medication Reconciliation: complete     Face to face nursing time: 8 minutes    Kaykay Muñiz MA     "

## 2017-05-04 NOTE — MR AVS SNAPSHOT
After Visit Summary   5/4/2017    Chiqui Arias    MRN: [id number]           Patient Information     Date Of Birth          1975        Visit Information        Provider Department      5/4/2017 10:45 AM Reagan Eaton MD [facility]      Today's Diagnoses     Median arcuate ligament syndrome (H)    -  1       Follow-ups after your visit        Your next 10 appointments already scheduled     May 16, 2017   Procedure with Reagan Eaton MD   [facility] (--)    [address]  [facility] [address] [phone]              Who to contact     If you have questions or need follow up information about today's clinic visit or your schedule please contact [facility] directly at [phone].  Normal or non-critical lab and imaging results will be communicated to you by MyChart, letter or phone within 4 business days after the clinic has received the results. If you do not hear from us within 7 days, please contact the clinic through [employer] or phone. If you have a critical or abnormal lab result, we will notify you by phone as soon as possible.  Submit refill requests through [email or web address] or call your pharmacy and they will forward the refill request to us. Please allow 3 business days for your refill to be completed.          Additional Information About Your Visit        MyChart Information     [email or web address] gives you secure access to your electronic health record. If you see a primary care provider, you can also send messages to your care team and make appointments. If you have questions, please call your primary care clinic.  If you do not have a primary care provider, please call [phone] and they will assist you.        Care EveryWhere ID     This is your Care EveryWhere ID. This could be used by other organizations to access your [facility]  records  [id number]        Your Vitals Were     Pulse Breastfeeding?                97 No           Blood Pressure from Last 3 Encounters:   05/04/17 125/89   03/23/17 126/88   02/05/17 116/77    Weight from Last 3 Encounters:   03/23/17 144 lb (65.3 kg)   02/05/17 143 lb (64.9 kg)   10/19/12 144 lb 12.8 oz (65.7 kg)              Today, you had the following     No orders found for display       Primary Care Provider Office Phone # Fax #    Ladan Scanlon PA-C [phone]       [facility] [address]  [facility] [address]        Thank you!     Thank you for choosing [facility]  for your care. Our goal is always to provide you with excellent care. Hearing back from our patients is one way we can continue to improve our services. Please take a few minutes to complete the written survey that you may receive in the mail after your visit with us. Thank you!             Your Updated Medication List - Protect others around you: Learn how to safely use, store and throw away your medicines at www.disposemymeds.org.          This list is accurate as of: 5/4/17  6:46 PM.  Always use your most recent med list.                   Brand Name Dispense Instructions for use    chlorthalidone 25 MG tablet    HYGROTON     Take 25 mg by mouth       oxyCODONE-acetaminophen 5-325 MG per tablet    PERCOCET    25 tablet    Take 1-2 tablets by mouth every 4 hours as needed for pain

## 2017-05-08 ENCOUNTER — TELEPHONE (OUTPATIENT)
Dept: OTHER | Facility: CLINIC | Age: 42
End: 2017-05-08

## 2017-05-08 NOTE — TELEPHONE ENCOUNTER
Chiqui SIBLEY Juana called the office about her MALS surgery and is unsure she wants to proceed.   I do feel this is necessary (see office note).  She wanted to talk this over with her mother (she has not been at any of her appointments)    I tried to call her at the two telephone #s-- neither is active.   We have no name or contact to her mother..     Reagan Eaton MD

## 2017-05-12 ENCOUNTER — TELEPHONE (OUTPATIENT)
Dept: OTHER | Facility: CLINIC | Age: 42
End: 2017-05-12

## 2017-05-12 NOTE — TELEPHONE ENCOUNTER
Chiqui called our answering service on 05/09/17 and stated she wants to reschedule her surgery that was for 05/16/17 anytime after June 9th.  She left a number of [phone] to call her back at.  I left a message on 05/09/17 @ 1:32 and just now.  The number she left with the answering service is not listed in her chart, but neither of the numbers in her chart are in service.  On the voicemail I left today, I told her that this was my final attempt to reach her. Andreina Moore,

## 2017-06-03 ENCOUNTER — HEALTH MAINTENANCE LETTER (OUTPATIENT)
Age: 42
End: 2017-06-03

## 2017-06-13 ENCOUNTER — TELEPHONE (OUTPATIENT)
Dept: SURGERY | Facility: CLINIC | Age: 42
End: 2017-06-13
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
## 2017-06-22 ENCOUNTER — TELEPHONE (OUTPATIENT)
Dept: OTHER | Facility: CLINIC | Age: 42
End: 2017-06-22

## 2017-06-22 DIAGNOSIS — I77.4 CELIAC ARTERY COMPRESSION SYNDROME (H): Primary | ICD-10-CM

## 2017-06-22 NOTE — TELEPHONE ENCOUNTER
I tried to call Chiqui Arias about her MALS surgery tomorrow.  No answer (1800 hrs).  I left a message.    Reagan Eaton

## 2017-06-23 ENCOUNTER — TELEPHONE (OUTPATIENT)
Dept: OTHER | Facility: CLINIC | Age: 42
End: 2017-06-23

## 2017-06-23 ENCOUNTER — ANESTHESIA EVENT (OUTPATIENT)
Dept: SURGERY | Facility: CLINIC | Age: 42
End: 2017-06-23

## 2017-06-23 ENCOUNTER — ANESTHESIA (OUTPATIENT)
Dept: SURGERY | Facility: CLINIC | Age: 42
End: 2017-06-23

## 2017-06-23 NOTE — TELEPHONE ENCOUNTER
Chiqui Arias again did not show up for her surgery today.  We were unable to get ahold of her last PM or today (she did speak with us yesterday morning about her bowel prep).  He boyfriend did come to the hospital today-- he was unaware that she was not coming in and unable to reach her on her cell. I also tried to call her mother Melanie who has her # listed-- no answer. Left message to call.    Pt has a very severe symptomatic Celiac compression also involving her SMA to a lesser degree.  I fear she could go on to develop scar tissue and occlude the artery and possible lead to life threatening bowel ischemia.  Pt is aware of this.  She knows this is an operation that needs to be done and could significantly improve her QOL.       Reagan Eaton MD

## 2017-06-29 ENCOUNTER — TELEPHONE (OUTPATIENT)
Dept: OTHER | Facility: CLINIC | Age: 42
End: 2017-06-29

## 2017-06-29 NOTE — TELEPHONE ENCOUNTER
Chiqui left a voicemail stating that she missed her surgery, that she is scared to have surgery and that she called her insurance company and there is a doctor closer to home that she is going to get a second opinion from.  She said that her cell phone is [phone].  She states that she does not always have the phone but that she can usually get the messages from it. She has given us her friend Saad's number to reach her at and her mom's number, so it is often difficult to know what she is using as a phone number.  She said that we can call her at [phone] now if we need to reach her.  I will provide this number to Dr. Eaton if he feels he needs to reach her.  I will file her surgery form since she does not want to schedule at this time. Andreina Moore,

## 2017-07-11 ENCOUNTER — TELEPHONE (OUTPATIENT)
Dept: OTHER | Facility: CLINIC | Age: 42
End: 2017-07-11

## 2017-07-11 NOTE — TELEPHONE ENCOUNTER
"I called Chiqui SIBLEY Nallelyfabiana and spoke with her today.  She has severe MALS.  She did not show up for her surgery 2 weeks ago.  She \"was to sacred to come in\".  I stressed the importance of doing this surgery.  We hope the severe compression of the celiac artery ( not affected with respiration thus very severe) that she only has extrinsic compression and not a scarred artery that needs a bypass.  I think she understands the importance of dealing with this problem and will call Andreina madrid reschedule.      Reagan Eaton MD   "

## 2017-08-01 ENCOUNTER — TELEPHONE (OUTPATIENT)
Dept: OTHER | Facility: CLINIC | Age: 42
End: 2017-08-01

## 2017-08-01 RX ORDER — CEFAZOLIN SODIUM 2 G/100ML
2 INJECTION, SOLUTION INTRAVENOUS
Status: CANCELLED | OUTPATIENT
Start: 2017-08-01

## 2017-08-01 NOTE — TELEPHONE ENCOUNTER
I tried to call Chiqui SIBLEY Juana about her surgery tomorrow.  Her last # was     [phone].  This # was not accepting any calls.  It is ?able if she will show up for her surgery tomorrow.  She has not shown up for the surgery multiple times already.       Reagan Eaton MD

## 2017-08-02 ENCOUNTER — HOSPITAL ENCOUNTER (INPATIENT)
Facility: CLINIC | Age: 42
LOS: 1 days | Discharge: LEFT BEFORE DISCHARGE | DRG: 392 | End: 2017-08-03
Attending: SURGERY | Admitting: SURGERY
Payer: COMMERCIAL

## 2017-08-02 PROBLEM — E87.6 HYPOKALEMIA: Status: ACTIVE | Noted: 2017-08-02

## 2017-08-02 LAB
AMPHETAMINES UR QL SCN: ABNORMAL
ANION GAP SERPL CALCULATED.3IONS-SCNC: 11 MMOL/L (ref 3–14)
BARBITURATES UR QL: ABNORMAL
BENZODIAZ UR QL: ABNORMAL
BUN SERPL-MCNC: 27 MG/DL (ref 7–30)
CALCIUM SERPL-MCNC: 8.9 MG/DL (ref 8.5–10.1)
CANNABINOIDS UR QL SCN: ABNORMAL
CHLORIDE SERPL-SCNC: 105 MMOL/L (ref 94–109)
CO2 SERPL-SCNC: 24 MMOL/L (ref 20–32)
COCAINE UR QL: ABNORMAL
CREAT SERPL-MCNC: 1.06 MG/DL (ref 0.52–1.04)
GFR SERPL CREATININE-BSD FRML MDRD: 57 ML/MIN/1.7M2
GLUCOSE SERPL-MCNC: 127 MG/DL (ref 70–99)
HCG SERPL QL: NEGATIVE
MAGNESIUM SERPL-MCNC: 2.4 MG/DL (ref 1.6–2.3)
OPIATES UR QL SCN: ABNORMAL
PCP UR QL SCN: ABNORMAL
POTASSIUM SERPL-SCNC: 2.4 MMOL/L (ref 3.4–5.3)
SODIUM SERPL-SCNC: 140 MMOL/L (ref 133–144)

## 2017-08-02 PROCEDURE — 25000128 H RX IP 250 OP 636: Performed by: PHYSICIAN ASSISTANT

## 2017-08-02 PROCEDURE — 99221 1ST HOSP IP/OBS SF/LOW 40: CPT | Performed by: SURGERY

## 2017-08-02 PROCEDURE — 80307 DRUG TEST PRSMV CHEM ANLYZR: CPT | Performed by: PHYSICIAN ASSISTANT

## 2017-08-02 PROCEDURE — 80048 BASIC METABOLIC PNL TOTAL CA: CPT | Performed by: SURGERY

## 2017-08-02 PROCEDURE — 93010 ELECTROCARDIOGRAM REPORT: CPT | Performed by: INTERNAL MEDICINE

## 2017-08-02 PROCEDURE — 25000125 ZZHC RX 250: Performed by: SURGERY

## 2017-08-02 PROCEDURE — 99222 1ST HOSP IP/OBS MODERATE 55: CPT | Performed by: PHYSICIAN ASSISTANT

## 2017-08-02 PROCEDURE — 40000883 ZZH CANCELLED SURGERY UP TO 61-90 MINS: Performed by: SURGERY

## 2017-08-02 PROCEDURE — 36415 COLL VENOUS BLD VENIPUNCTURE: CPT | Performed by: SURGERY

## 2017-08-02 PROCEDURE — 99207 ZZC CONSULT E&M CHANGED TO INITIAL LEVEL: CPT | Performed by: PHYSICIAN ASSISTANT

## 2017-08-02 PROCEDURE — 93005 ELECTROCARDIOGRAM TRACING: CPT

## 2017-08-02 PROCEDURE — 84703 CHORIONIC GONADOTROPIN ASSAY: CPT | Performed by: SURGERY

## 2017-08-02 PROCEDURE — 83735 ASSAY OF MAGNESIUM: CPT | Performed by: SURGERY

## 2017-08-02 PROCEDURE — 12000007 ZZH R&B INTERMEDIATE

## 2017-08-02 RX ORDER — PROCHLORPERAZINE MALEATE 5 MG
5-10 TABLET ORAL EVERY 6 HOURS PRN
Status: CANCELLED | OUTPATIENT
Start: 2017-08-02

## 2017-08-02 RX ORDER — SODIUM CHLORIDE 9 MG/ML
INJECTION, SOLUTION INTRAVENOUS CONTINUOUS
Status: DISCONTINUED | OUTPATIENT
Start: 2017-08-02 | End: 2017-08-03 | Stop reason: HOSPADM

## 2017-08-02 RX ORDER — METOCLOPRAMIDE HYDROCHLORIDE 5 MG/ML
10 INJECTION INTRAMUSCULAR; INTRAVENOUS EVERY 6 HOURS PRN
Status: CANCELLED | OUTPATIENT
Start: 2017-08-02

## 2017-08-02 RX ORDER — ONDANSETRON 2 MG/ML
4 INJECTION INTRAMUSCULAR; INTRAVENOUS EVERY 6 HOURS PRN
Status: CANCELLED | OUTPATIENT
Start: 2017-08-02

## 2017-08-02 RX ORDER — ONDANSETRON 4 MG/1
4 TABLET, ORALLY DISINTEGRATING ORAL EVERY 6 HOURS PRN
Status: CANCELLED | OUTPATIENT
Start: 2017-08-02

## 2017-08-02 RX ORDER — POTASSIUM CHLORIDE 1500 MG/1
20-40 TABLET, EXTENDED RELEASE ORAL
Status: DISCONTINUED | OUTPATIENT
Start: 2017-08-02 | End: 2017-08-03 | Stop reason: HOSPADM

## 2017-08-02 RX ORDER — LIDOCAINE 40 MG/G
CREAM TOPICAL
Status: CANCELLED | OUTPATIENT
Start: 2017-08-02

## 2017-08-02 RX ORDER — NALOXONE HYDROCHLORIDE 0.4 MG/ML
.1-.4 INJECTION, SOLUTION INTRAMUSCULAR; INTRAVENOUS; SUBCUTANEOUS
Status: CANCELLED | OUTPATIENT
Start: 2017-08-02

## 2017-08-02 RX ORDER — MAGNESIUM SULFATE HEPTAHYDRATE 40 MG/ML
4 INJECTION, SOLUTION INTRAVENOUS EVERY 4 HOURS PRN
Status: DISCONTINUED | OUTPATIENT
Start: 2017-08-02 | End: 2017-08-03 | Stop reason: HOSPADM

## 2017-08-02 RX ORDER — POTASSIUM CHLORIDE 1.5 G/1.58G
20-40 POWDER, FOR SOLUTION ORAL
Status: DISCONTINUED | OUTPATIENT
Start: 2017-08-02 | End: 2017-08-03 | Stop reason: HOSPADM

## 2017-08-02 RX ORDER — POTASSIUM CHLORIDE 7.45 MG/ML
10 INJECTION INTRAVENOUS
Status: DISCONTINUED | OUTPATIENT
Start: 2017-08-02 | End: 2017-08-03 | Stop reason: HOSPADM

## 2017-08-02 RX ORDER — METOCLOPRAMIDE 10 MG/1
10 TABLET ORAL EVERY 6 HOURS PRN
Status: CANCELLED | OUTPATIENT
Start: 2017-08-02

## 2017-08-02 RX ORDER — POTASSIUM CL/LIDO/0.9 % NACL 10MEQ/0.1L
10 INTRAVENOUS SOLUTION, PIGGYBACK (ML) INTRAVENOUS
Status: DISCONTINUED | OUTPATIENT
Start: 2017-08-02 | End: 2017-08-03 | Stop reason: HOSPADM

## 2017-08-02 RX ORDER — POTASSIUM CHLORIDE 29.8 MG/ML
20 INJECTION INTRAVENOUS
Status: DISCONTINUED | OUTPATIENT
Start: 2017-08-02 | End: 2017-08-03 | Stop reason: HOSPADM

## 2017-08-02 RX ADMIN — POTASSIUM CHLORIDE 10 MEQ: 14.9 INJECTION, SOLUTION, CONCENTRATE PARENTERAL at 16:22

## 2017-08-02 RX ADMIN — SODIUM CHLORIDE: 9 INJECTION, SOLUTION INTRAVENOUS at 18:08

## 2017-08-02 RX ADMIN — POTASSIUM CHLORIDE 10 MEQ: 14.9 INJECTION, SOLUTION, CONCENTRATE PARENTERAL at 20:35

## 2017-08-02 RX ADMIN — POTASSIUM CHLORIDE 10 MEQ: 14.9 INJECTION, SOLUTION, CONCENTRATE PARENTERAL at 14:16

## 2017-08-02 RX ADMIN — POTASSIUM CHLORIDE 10 MEQ: 14.9 INJECTION, SOLUTION, CONCENTRATE PARENTERAL at 15:07

## 2017-08-02 RX ADMIN — POTASSIUM CHLORIDE 10 MEQ: 14.9 INJECTION, SOLUTION, CONCENTRATE PARENTERAL at 18:09

## 2017-08-02 RX ADMIN — POTASSIUM CHLORIDE 10 MEQ: 14.9 INJECTION, SOLUTION, CONCENTRATE PARENTERAL at 21:40

## 2017-08-02 NOTE — CONSULTS
[facility]    Vascular Medicine Consultation     Date of Admission:  8/2/2017  Date of Consult : 08/02/17      Attestation: I have not examined the patient independently of Anette Johnson PA-C, but did discuss this case with her on 8-2-17  and agree with the plan as delineated below.    Warner Perez MD     Assessment & Plan   1. Celiac artery compression syndrome    She was first diagnosed with this in 2015 and has now finally agreed to proceed with surgery. However, upon presentation today she was noted to have a potassium of 2.4. Surgery has been postponed and her potassium will be replaced so that surgery can be rescheduled for Friday, August 4.     2. Hypokalemia    She has a history of hypokalemia and had been placed on potassium supplementation while taking her chlorthalidone in the past. However, she states she stopped her potassium supplements a few days ago when she ran out. Her potassium yesterday was noted to be 2.6 and today it is 2.4. Surgery has been cancelled and she will be admitted for potassium replacement. She also has a history of dehydration from decreased oral intake and this may play a role as well. No further work-up of hypokalemia is necessary at this time. Medical compliance with outpatient potassium supplementation was encouraged.     3. Ongoing tobacco use    She continues to smoke about 1 pack per day. The importance of smoking cessation was stressed. However, she declines any assistance in quitting smoking, stating she will continue to smoke if she feels like it. She also declines any nicotine patches while in the hospital.     4. History of positive amphetamine use per urine tox screen in 6/2017 with odd behavior on admission    She has used amphetamines in 6/2017, but she denies recent use. She does have a flat affect and appears confused at times with odd behavior. Will monitor and obtain a urine toxicology screen. If this again returns positive, then a  substance abuse consultation will be placed.      5. Hypertension    She has listed on her H&P as being on amlodipine. However, in her purse is chlorthalidone which is what she states she has been taking. Given her hypokalemia, her chlorthalidone will be held and her blood pressure will be monitored closely.     Reason for Consult   Reason for consult: Asked by Dr. Eaton to evaluate vascular risk factors and assist with medical management in this 42 year old female smoker with hypertension and hypokalemia who presented for surgical repair of celiac artery compression syndrome.     Primary Care Physician   Venkatesh Jarvis      History of Present Illness    Chiqui Arias is a 42 year old female smoker who had been diagnosed back in 2015 at [facility] with celiac artery compression syndrome. Surgery had been recommended at the time, but she had declined. She continued to have ongoing upper abdominal pain that occurs primarily after exercise. She was evaluated by Dr. Eaton of Vascular Surgery in 3/2017. Imaging confirmed significant compression of the celiac artery and to a small degree the SMA and an open mobilization of the celiac and SMA arteries with division of the median arcuate ligament was recommended. She was scheduled for surgery a few times in the past and would either cancel last minute or not show. She did show today, but was over 2 hours late. It was noted on her pre-operative H&P yesterday that he potassium was 2.6. A repeat potassium today is significant for a potassium of 2.4. Surgery has been cancelled and she will be admitted to get her potassium replaced.     Past Medical History   Past Medical History:   Diagnosis Date     Anxiety disorder      Celiac artery compression syndrome (H)      Chlamydia 12/00, 10/00    TREATED     Hypertension      Other chronic pain        Past Surgical History   Past Surgical History:   Procedure Laterality Date     APPENDECTOMY       ENT SURGERY      ear  surgery , behind right ear     None       ORAL SURGERY IP CONSULT       SOFT TISSUE SURGERY      cyst removal left shoulder       Prior to Admission Medications   Prior to Admission Medications   Prescriptions Last Dose Informant Patient Reported? Taking?   chlorthalidone (HYGROTON) 25 MG tablet   Yes No   Sig: Take 25 mg by mouth   oxyCODONE-acetaminophen (PERCOCET) 5-325 MG per tablet   No No   Sig: Take 1-2 tablets by mouth every 4 hours as needed for pain   Patient not taking: Reported on 3/23/2017      Facility-Administered Medications: None     Allergies   Allergies   Allergen Reactions     Lisinopril Hives     Morphine [Fumaric Acid]      hives     Wasp Venom Protein        Social History   Chiqui Arias  reports that she has been smoking Cigarettes.  She has been smoking about 1.00 pack per day. She has never used smokeless tobacco. She reports that she drinks alcohol. She reports that she does not use illicit drugs.    Family History   Family History   Problem Relation Age of Onset     Thyroid Disease Mother      HEART DISEASE Father      CANCER Father      melanoma     Alzheimer Disease Paternal Grandmother        Review of Systems   The 10 point Review of Systems is negative other than noted in the HPI or here.     Physical Exam   VSS  Constitutional: awake, alert, cooperative, no apparent distress, and appears stated age  Eyes: Lids and lashes normal, pupils equal, round and reactive to light, extra ocular muscles intact, sclera clear, conjunctiva normal  ENT: normocepalic, without obvious abnormality, oropharynx pink and moist  Hematologic / Lymphatic: no lymphadenopathy  Respiratory: No increased work of breathing, good air exchange, clear to auscultation bilaterally, no crackles or wheezing  Cardiovascular: regular rate and rhythm, normal S1 and S2 and no murmur noted  GI: Normal bowel sounds, soft, non-distended, non-tender  Skin: no redness, warmth, or swelling, no rashes and no  lesions  Musculoskeletal: There is no redness, warmth, or swelling of the joints.  Full range of motion noted.  Motor strength is 5 out of 5 all extremities bilaterally.  Tone is normal.  Neurologic: Awake, alert, oriented to name, place and time.  Cranial nerves II-XII are grossly intact.  Motor is 5 out of 5 bilaterally.   Neuropsychiatric:  Flat affect, poor memory and insight.  Pulses: Palpable pedal pulses bilaterally.    Data   Most Recent 3 CBC's:  Recent Labs   Lab Test  02/05/17   0430   WBC  8.6   HGB  15.4   MCV  89   PLT  500*     Most Recent 3 BMP's:  Recent Labs   Lab Test  08/02/17   1300  02/05/17   0430   NA  140  136   POTASSIUM  2.4*  2.7*   CHLORIDE  105  97   CO2  24  30   BUN  27  25   CR  1.06*  0.99   ANIONGAP  11  9   COSTA  8.9  9.2   GLC  127*  112*     Most Recent 3 INR's:No lab results found.  Most Recent Cholesterol Panel:No lab results found.  Most Recent Hemoglobin A1c:No lab results found.
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
## 2017-08-02 NOTE — CONSULTS
[facility]    Hospitalist Consultation    Date of Admission:  8/2/2017  Date of Consult (When I saw the patient): 08/02/17    Assessment & Plan   Chiqui Arias is a 42 year old female with past medical history significant for hypertension, hypokalemia, tobacco abuse, history of amphetamine abuse who was admitted on 8/2/2017. I was asked to see the patient for hypokalemia.    Celiac artery compression syndrome She was first diagnosed with this in 2015 and has now finally agreed to proceed with surgery. However, upon presentation today she was noted to have a potassium of 2.4. Surgery has been postponed and her potassium will be replaced so that surgery can be rescheduled for Friday, August 4.      Hypokalemia She has a history of hypokalemia and had been placed on potassium supplementation while taking her chlorthalidone in the past. However, she states she stopped her potassium supplements a few days ago when she ran out. Her potassium yesterday was noted to be 2.6 and today it is 2.4.   --  Replace per protocol  --  Recheck BMP tomorrow AM  --  EKG now  --  Monitor on telemetry overnight     Ongoing tobacco use She continues to smoke about 1 pack per day. The importance of smoking cessation was stressed. However, she declines any assistance in quitting smoking, stating she will continue to smoke if she feels like it. She also declines any nicotine patches while in the hospital.      History of positive amphetamine use per urine tox screen in 6/2017 with odd behavior on admission She has used amphetamines in 6/2017, but she denies recent use. She does have a flat affect and appears confused at times with odd behavior.   --  Urine tox screen  --  If urine toxicology positive, patient will need substance abuse consultation     Hypertension, benign essential   She has listed on her H&P as being on amlodipine. However, in her purse is chlorthalidone which is what she states she has been taking.    --  Hold PTA chlorthalidone  --  Consider transitioning to amlodipine or lisinopril       DVT Prophylaxis: Defer to primary service  Code Status: No Order    Disposition: Expected discharge in >2 days once hypokalemia corrected and s/p surgery.    Yokasta Ramirez PA-C    The patient was discussed with Dr. Shirley who agrees with the plan as outlined above.    Reason for Consult   Reason for consult: I was asked by Anette Johnson PA-C to evaluate this patient for hypokalemia.    Primary Care Physician   Dr Venkatesh Jarvis    Chief Complaint   Abdominal pain    History is obtained from the electronic health record    History of Present Illness   Chiqui Arias is a 42 year old female with past medical history significant for hypertension, celiac artery compression syndrome, tobacco abuse and amphetamine use who presented for celiac artery surgery. She was initially diagnosed with celiac artery compression syndrome in 2015 at [facility]. She continued to have ongoing upper abdominal pain that occurs primarily after exercise. She was evaluated by Dr. Eaton of Vascular Surgery in 3/2017. Imaging confirmed significant compression of the celiac artery and to a small degree the SMA and an open mobilization of the celiac and SMA arteries with division of the median arcuate ligament was recommended. She was scheduled for surgery a few times in the past and would either cancel last minute or not show. She did show today, but was over 2 hours late. It was noted on her pre-operative H&P yesterday that he potassium was 2.6. A repeat potassium today is significant for a potassium of 2.4. Surgery has been cancelled and she will be admitted to get her potassium replaced.     I met with the patient on the medical floor. Patient is resting in bed. She will respond to questions but pretends to be sleeping the majority of my interview. She denies palpitations, recent amphetamine use, chest pain, urinary symptom,  shortness of breath, changes in bowel habits.    Past Medical History    I have reviewed this patient's medical history and updated it with pertinent information if needed.   Past Medical History:   Diagnosis Date     Anxiety disorder      Celiac artery compression syndrome (H)      Chlamydia 12/00, 10/00    TREATED     Hypertension      Other chronic pain        Past Surgical History   I have reviewed this patient's surgical history and updated it with pertinent information if needed.  Past Surgical History:   Procedure Laterality Date     APPENDECTOMY       ENT SURGERY      ear surgery , behind right ear     None       ORAL SURGERY IP CONSULT       SOFT TISSUE SURGERY      cyst removal left shoulder       Prior to Admission Medications   Prior to Admission Medications   Prescriptions Last Dose Informant Patient Reported? Taking?   chlorthalidone (HYGROTON) 25 MG tablet 8/2/2017 at Unknown time  Yes Yes   Sig: Take 25 mg by mouth      Facility-Administered Medications: None     Allergies   Allergies   Allergen Reactions     Lisinopril Hives     Morphine [Fumaric Acid]      hives     Wasp Venom Protein        Social History   I have reviewed this patient's social history and updated it with pertinent information if needed. Chiqui Arias  reports that she has been smoking Cigarettes.  She has been smoking about 1.00 pack per day. She has never used smokeless tobacco. She reports that she drinks alcohol. She reports that she does not use illicit drugs.    Family History   I have reviewed this patient's family history and updated it with pertinent information if needed.   Family History   Problem Relation Age of Onset     Thyroid Disease Mother      HEART DISEASE Father      CANCER Father      melanoma     Alzheimer Disease Paternal Grandmother        Review of Systems   The 10 point Review of Systems is negative other than noted in the HPI     Physical Exam   Temp: 97.9  F (36.6  C)   BP: 122/73   Heart Rate:  70 Resp: 14 SpO2: 100 % O2 Device: None (Room air)    Vital Signs with Ranges  Temp:  [97.9  F (36.6  C)] 97.9  F (36.6  C)  Heart Rate:  [70-97] 70  Resp:  [14-20] 14  BP: (122-127)/(73-83) 122/73  SpO2:  [100 %] 100 %  0 lbs 0 oz    Constitutional: Well appearing female  HEENT: Equal and reactive to light  Respiratory: CTAB  Cardiovascular: RRR, no murmur  GI: soft, non-tender, non-distended  Skin: warm, dry  Musculoskeletal: no gross deformities  Neurologic: oriented x3  Psychiatric: flat affect    Data   -Data reviewed today: All pertinent laboratory and imaging results from this encounter were reviewed. I personally reviewed no images or EKG's today.    Recent Labs  Lab 08/02/17  1300      POTASSIUM 2.4*   CHLORIDE 105   CO2 24   BUN 27   CR 1.06*   ANIONGAP 11   COSTA 8.9   *       No results found for this or any previous visit (from the past 24 hour(s)).

## 2017-08-02 NOTE — PROGRESS NOTES
Transported to [facility] in cart with this RN and NA accompanying.   Potassium bump running per infusion pump.  Report to PRANEETH Joshua on 33.

## 2017-08-03 ENCOUNTER — TELEPHONE (OUTPATIENT)
Dept: OTHER | Facility: CLINIC | Age: 42
End: 2017-08-03

## 2017-08-03 VITALS
OXYGEN SATURATION: 100 % | RESPIRATION RATE: 18 BRPM | TEMPERATURE: 97.9 F | HEART RATE: 77 BPM | SYSTOLIC BLOOD PRESSURE: 122 MMHG | DIASTOLIC BLOOD PRESSURE: 71 MMHG

## 2017-08-03 LAB
POTASSIUM SERPL-SCNC: 3 MMOL/L (ref 3.4–5.3)
POTASSIUM SERPL-SCNC: 3.5 MMOL/L (ref 3.4–5.3)
POTASSIUM SERPL-SCNC: 3.9 MMOL/L (ref 3.4–5.3)

## 2017-08-03 PROCEDURE — 84132 ASSAY OF SERUM POTASSIUM: CPT | Performed by: INTERNAL MEDICINE

## 2017-08-03 PROCEDURE — 25000125 ZZHC RX 250: Performed by: SURGERY

## 2017-08-03 PROCEDURE — 36415 COLL VENOUS BLD VENIPUNCTURE: CPT | Performed by: INTERNAL MEDICINE

## 2017-08-03 PROCEDURE — 99231 SBSQ HOSP IP/OBS SF/LOW 25: CPT | Performed by: PHYSICIAN ASSISTANT

## 2017-08-03 PROCEDURE — 99231 SBSQ HOSP IP/OBS SF/LOW 25: CPT | Performed by: SURGERY

## 2017-08-03 PROCEDURE — 84132 ASSAY OF SERUM POTASSIUM: CPT | Performed by: SURGERY

## 2017-08-03 PROCEDURE — 36415 COLL VENOUS BLD VENIPUNCTURE: CPT | Performed by: SURGERY

## 2017-08-03 RX ADMIN — POTASSIUM CHLORIDE 10 MEQ: 14.9 INJECTION, SOLUTION, CONCENTRATE PARENTERAL at 09:34

## 2017-08-03 RX ADMIN — POTASSIUM CHLORIDE 10 MEQ: 14.9 INJECTION, SOLUTION, CONCENTRATE PARENTERAL at 04:06

## 2017-08-03 RX ADMIN — POTASSIUM CHLORIDE 10 MEQ: 14.9 INJECTION, SOLUTION, CONCENTRATE PARENTERAL at 02:48

## 2017-08-03 RX ADMIN — POTASSIUM CHLORIDE 10 MEQ: 14.9 INJECTION, SOLUTION, CONCENTRATE PARENTERAL at 05:11

## 2017-08-03 RX ADMIN — POTASSIUM CHLORIDE 10 MEQ: 14.9 INJECTION, SOLUTION, CONCENTRATE PARENTERAL at 01:22

## 2017-08-03 RX ADMIN — POTASSIUM CHLORIDE 10 MEQ: 14.9 INJECTION, SOLUTION, CONCENTRATE PARENTERAL at 11:01

## 2017-08-03 NOTE — PHARMACY-ADMISSION MEDICATION HISTORY
Admission medication history interview status for the 8/2/2017  admission is complete. See EPIC admission navigator for prior to admission medications     Medication history source reliability:Good    Actions taken by pharmacist (provider contacted, etc): spoke to pt     Additional medication history information not noted on PTA med list :None    Medication reconciliation/reorder completed by provider prior to medication history? Yes    Time spent in this activity: 5 minutes    Prior to Admission medications    Medication Sig Last Dose Taking? Auth Provider   chlorthalidone (HYGROTON) 25 MG tablet Take 25 mg by mouth daily  8/2/2017 at Unknown time Yes Reported, Patient   Lala Robertson, PharmD

## 2017-08-03 NOTE — PLAN OF CARE
Problem: Goal Outcome Summary  Goal: Goal Outcome Summary  Outcome: No Change  A&Ox4.  AVSS.  SR on the monitor.  K+ recheck still low at 3.0, replaced with total of 40mEq IVPB, with  Recheck at 0800.  Denies pain.  Slept for most part of the shift.

## 2017-08-03 NOTE — PROGRESS NOTES
[facility]    Vascular Medicine Progress Note    Date of Service: 08/03/2017       Attestation: I have not examined the patient independently of Anette Johnson PA-C, but did discuss this case with her on 8-3-17  and agree with the plan as delineated below.    Warner Perez MD      Assessment & Plan   1. Celiac artery compression syndrome     Assessment: Potassium now corrected. Abdominal pain controlled presently.   Plan: Surgery scheduled for tomorrow with Dr. Eaton.      2. Hypokalemia     Assessment: Likely secondary to chlorthalidone and poor oral intake/dehydration.   Plan: Continue potassium replacement as needed. Holding chlorthalidone.    3. Ongoing tobacco use     Assessment: Still smoking and has no desire to quit.   Plan: Encouraged cessation. Refused patches while in hospital.      4. Urine toxicology positive for amphetamines with odd behavior on admission     Assessment: She has used amphetamines in 6/2017, and again urine tox is positive. Refuses to discuss this.   Plan: Consider substance abuse consultation after surgery completed.      5. Hypertension     Assessment: BP stable off BP meds.  Plan: Monitor BP for now.       Interval History   Sleeping. No complaints. Urine tox positive for amphetamines. Very defensive of her belongings per nursing.     Physical Exam   Temp: 98.2  F (36.8  C) Temp src: Oral BP: 117/77 Pulse: 83 Heart Rate: 81 Resp: 16 SpO2: 99 % O2 Device: None (Room air)    There were no vitals filed for this visit.  Vital Signs with Ranges  Temp:  [97.6  F (36.4  C)-98.2  F (36.8  C)] 98.2  F (36.8  C)  Pulse:  [68-84] 83  Heart Rate:  [70-97] 81  Resp:  [14-20] 16  BP: ()/(55-83) 117/77  SpO2:  [98 %-100 %] 99 %  I/O last 3 completed shifts:  In: 2650 [P.O.:1220; I.V.:1430]  Out: 800 [Urine:800]    Constitutional: Awake, alert, cooperative, no apparent distress, and appears stated age.  Eyes: Lids and lashes normal, sclera clear, conjunctiva  normal.  ENT: Normocephalic, without obvious abnormality, atraumatic, oral pharynx with moist mucus membranes  Respiratory: No increased work of breathing, good air exchange, clear to auscultation bilaterally, no crackles or wheezing.  Cardiovascular: Regular rate and rhythm, normal S1 and S2, no S3 or S4, and no murmur noted.  GI: Normal bowel sounds, soft, non-distended, non-tender  Skin: No bruising or bleeding, normal skin color, texture, turgor, no redness, warmth, or swelling, no rashes, no lesions  Musculoskeletal: There is no redness, warmth, or swelling of the joints.    Neurologic: Awake, alert, oriented to name, place and time.  Cranial nerves II-XII are grossly intact.    Neuropsychiatric: Calm, sleepy    Medications     NaCl 100 mL/hr at 08/03/17 0124          Data     Recent Labs  Lab 08/03/17  0800 08/03/17  0026 08/02/17  1300   NA  --   --  140   POTASSIUM 3.5 3.0* 2.4*   CHLORIDE  --   --  105   CO2  --   --  24   BUN  --   --  27   CR  --   --  1.06*   ANIONGAP  --   --  11   COSTA  --   --  8.9   GLC  --   --  127*     No results found for this or any previous visit (from the past 24 hour(s)).

## 2017-08-03 NOTE — PLAN OF CARE
Problem: Goal Outcome Summary  Goal: Goal Outcome Summary  Outcome: Improving  A/Ox4, pt appears anxious/restless. No c/o pain. SBA. Tolerating full liquid diet. AVSS ex mild HTN. tolerating fulls. BS active. Passing flatus. K+ replaced. Recheck in AM. CMS and neuro's intact. Plan for surgery tomorrow.

## 2017-08-03 NOTE — PROGRESS NOTES
Vascular Surgery Progress Note    S: Comfortable.      O:   Vitals:  BP  Min: 93/55  Max: 127/83  Temp  Av.9  F (36.6  C)  Min: 97.6  F (36.4  C)  Max: 98.1  F (36.7  C)  Pulse  Av  Min: 68  Max: 84  I/O last 3 completed shifts:  In: 1402 [P.O.:720; I.V.:682]  Out: 550 [Urine:550]    Physical Exam: Sleepy    VSS   Voding well                            Chest=clear   Abd=soft, +BS , non-tender                             Old well healed mid-line infraumbilical incision (appendectomy)      AM K=3.0    + Amphetamine urine toxicology screen      Assessment/Plan:  #1  Hypokalemia.  Responding to correction.  Continue with this.  Plan MALS surgery tomorrow.  Full liquids today.  MOM.                                     #2  Asked about drug use.  She refuses to discuss this I stressed importance of knowing her drug history due to risks with anesthesia.                                      #3  Smoking.  No smoking here.  Use IS pre-op.      Wm. Uma MD

## 2017-08-03 NOTE — PLAN OF CARE
Problem: Goal Outcome Summary  Goal: Goal Outcome Summary  Outcome: Improving  A/O, but lethargic during beginning of shift (pt states she's normally up at NOC). SBA, tele SR, potassium replaced per protocol, urine analysis completed for substances.

## 2017-08-03 NOTE — TELEPHONE ENCOUNTER
VASCULAR SURGERY    Chiqui Arias Was admitted for her MALS surgery yesterday.   She came in hypokalemic most likely due to her ongoing use of thiazides not appreciated by any of her physicians.  Preoperative laboratory did reveal a potassium of 2.6.  There unable to contact the patient until the morning of surgery.  Patient arrived several hours later and had eaten a chocolate malt prior to arrival.  Repeat Potassium was 2.4.    Due to this critical level surgery was not possible.  Patient was extremely unreliable because of this we kept her in the hospital for intravenous potassium replacements.  We also found on her urine tox screen to be positive for amphetamines.  She refused to discuss this.    We are able to replace her potassium with this now being 3.9 this afternoon.    Patient insisted that she be discharged from the hospital.  I personally spoke with her and stressed the importance of finally dealing with the vascular compression on her major abdominal arteries.  I'm concerned that this eventually may lead to complete occlusion of these arteries and possible irreversible intestinal ischemia or if not that the need for major vascular reconstructive surgery.  She is very adamant that we cannot keep her in the hospital in force her to have surgery.    We've been dealing with her for multiple months.  I do feel that she has a drug abuse problem and perhaps underlying psychiatric problems.  Since she has no signs of intestinal ischemia I cannot force her to have surgery.  We therefore had her sign out AMA and she immediately left the hospital approximately one hour ago.      I did speak with Samanta Dyson CNP on the phone within the last hour to discuss his situation.  Patient was not sent home on any medications.  She apparently had a history of hypertension and is questionable what medication she was actually taking.  She apparently was supposed to be taking Norvasc.  During her hospitalization her  systolic blood pressure was in the range of 120 to 130 with no medications--This probably does not require any medications.    They are now aware of the situation.  We have decided due to the multiple failures of the patient to cooperate and arrived for surgery along with these obvious drug abuse problems and perhaps psychiatric issues that we will not reschedule her for the surgery though I do feel it is strongly encouraged that she do so and tried to stress that upon her prior to relieving.  Her primary care physician's will also do not know her very well we'll try to contact her to have her come in to the office but this may be.  Difficult with her being homeless and oftentimes being unreachable by phone.       Reagan Eaton MD   Dictated 8/3/2017 at 1710 hrs.

## 2017-08-04 LAB — INTERPRETATION ECG - MUSE: NORMAL

## 2017-08-08 NOTE — DISCHARGE SUMMARY
[facility] Discharge Summary    Chiqui Arias MRN# [id number]   Age: 42 year old YOB: 1975     Date of Admission:  8/2/2017  Date of Discharge::  8/3/2017  4:44 PM  Admitting Physician:  Reagan Eaton MD  Discharge Physician:  Madan High MD             Admission Diagnoses:     ABDOMINAL PAIN  Median Arcuate Ligament Syndrome  Hypokalemia  Drug Abuse (Amphetamine Dependence)  Tobacco Dependence         Discharge Diagnosis:     Median Arcuate Ligament Syndrome   Same as above         Procedures:   Date:   No procedures were performed during this admission.             Medications Prior to Admission:     No prescriptions prior to admission.             Discharge Medications:     Patient left against medical advice. No medications were prescribed.           Consultations:     Consultation during this admission received from Vascular Medicine          Brief History of Illness:     Mrs. Arias is a 41yo female with a history of anxiety, tobacco and amphetamine abuse chronic persisting abdominal pain diagnosed with Median Arcuate Ligament Syndrome in 2015 following a CTA which showed severe impingement of the SMA and celiac arteries. She has been followed by Dr. Eaton and has been booked for surgery several times. She was admitted 08/02 for planned intervention but arrived late and was observed to have potassium of 2.6.  The procedure was cancelled.  She left hospital day 1 against medical advice.          Hospital Course:     The patient's hospital course was unremarkable. See above.  No interventions were performed.            Discharge Instructions and Follow-Up:   Discharge diet: Regular   Discharge activity: Activity as tolerated   Discharge follow-up: Patient left Against Medical    Wound care: None           Discharge Disposition:   Patient signed out against medical advice. Risks of discharging against advise were discussed including but not limited to death and  severe bowel ischemia.         Madan High MD   Vascular Surgery Fellow   Pager: [phone]     STAFF:  I agree with above.  Very difficult situation in a woman who has an obvious drug abuse problem perhaps psychiatric issues.  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  Significant collaterals have developed due to this.  This is caused by median arcuate ligament syndrome and she does need the arcuate ligament divided and potential revascularization of the celiac artery.  If this is not done I do have concern that she may completely occlude these two major intestinal arteries and thus suffer significant  Intestinal ischemia that may be lethal.               She has not shown up for surgery  Multiple times.  As mentioned above she came thi with marked hypokalemia due to thiazide medications that her other physicians are not aware she is taking and not prescribed by them.  She also ate just prior to arrival for her scheduled surgery.  We admitted her to correct her hypokalemia.  Urine toxicology  screen was positive for amphetamines.  After correcting her hypokalemia she was scheduled for surgery the following day.  She became very agitated and insisted that she be discharged and did not want her surgery.  I tried to discuss this further  With her but she was adamant on leaving. Since she is not in a life-threatening situation at this time. I could not force her to have  This surgery though I do feel it is definitely indicated for her long-term well-being. She thus left the hospital AMA.                 I called and spoke with her new  Primary care physician to update them on the situation.  We will try to see her in the  Office if she is willing to come in ( she is homeless and lives with friends and often unable to reach).                Despite our efforts to try to help this medical problem that may have even significant consequences  we have decided to make no further attempts to contact the patient and arrange decides to contact us to arrange this and she was informed of this decision.       Reagan Eaton MD   08-

## 2018-02-13 ENCOUNTER — THERAPY VISIT (OUTPATIENT)
Dept: PHYSICAL THERAPY | Facility: CLINIC | Age: 43
End: 2018-02-13
Payer: COMMERCIAL

## 2018-02-13 DIAGNOSIS — M54.2 CERVICALGIA: Primary | ICD-10-CM

## 2018-02-13 PROCEDURE — 97140 MANUAL THERAPY 1/> REGIONS: CPT | Mod: GP | Performed by: PHYSICAL THERAPIST

## 2018-02-13 PROCEDURE — 97161 PT EVAL LOW COMPLEX 20 MIN: CPT | Mod: GP | Performed by: PHYSICAL THERAPIST

## 2018-02-13 PROCEDURE — 97110 THERAPEUTIC EXERCISES: CPT | Mod: GP | Performed by: PHYSICAL THERAPIST
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
## 2018-02-27 ENCOUNTER — THERAPY VISIT (OUTPATIENT)
Dept: PHYSICAL THERAPY | Facility: CLINIC | Age: 43
End: 2018-02-27
Payer: COMMERCIAL

## 2018-02-27 DIAGNOSIS — M54.2 CERVICALGIA: Primary | ICD-10-CM

## 2018-02-27 PROCEDURE — 97110 THERAPEUTIC EXERCISES: CPT | Mod: GP | Performed by: PHYSICAL THERAPIST

## 2018-02-27 PROCEDURE — 97140 MANUAL THERAPY 1/> REGIONS: CPT | Mod: GP | Performed by: PHYSICAL THERAPIST

## 2018-03-13 ENCOUNTER — THERAPY VISIT (OUTPATIENT)
Dept: PHYSICAL THERAPY | Facility: CLINIC | Age: 43
End: 2018-03-13
Payer: COMMERCIAL

## 2018-03-13 DIAGNOSIS — M54.2 CERVICALGIA: Primary | ICD-10-CM

## 2018-03-13 PROCEDURE — 97140 MANUAL THERAPY 1/> REGIONS: CPT | Mod: GP | Performed by: PHYSICAL THERAPIST

## 2018-03-13 PROCEDURE — 97110 THERAPEUTIC EXERCISES: CPT | Mod: GP | Performed by: PHYSICAL THERAPIST

## 2018-04-09 ENCOUNTER — THERAPY VISIT (OUTPATIENT)
Dept: PHYSICAL THERAPY | Facility: CLINIC | Age: 43
End: 2018-04-09
Payer: COMMERCIAL

## 2018-04-09 DIAGNOSIS — M54.2 CERVICALGIA: Primary | ICD-10-CM

## 2018-04-09 PROCEDURE — 97140 MANUAL THERAPY 1/> REGIONS: CPT | Mod: GP | Performed by: PHYSICAL THERAPIST

## 2018-04-09 PROCEDURE — 97110 THERAPEUTIC EXERCISES: CPT | Mod: GP | Performed by: PHYSICAL THERAPIST

## 2018-05-30 NOTE — PROGRESS NOTES
Subjective:  HPI                    Objective:  System    Physical Exam    General     ROS    Assessment/Plan:    DISCHARGE REPORT    Patient did not return to PT following the last PT session on 4/9/2018.  Patient will be discharged at this time due to not returning to PT services. See below for the most recent subjective and objective findings  SUBJECTIVE  Subjective changes noted by patient:  Subjective: Patient reports feeling better since last PT session. Patient reports her main complaint is left sided upper trap tightness which causes mild pain with cervical rotation to the left    Current pain level is  Current Pain level: 3/10.     Previous pain level was   Initial Pain level: 9/10.     OBJECTIVE  Changes noted in objective findings:  Patient has failed to return to therapy so current objective findings are unknown.        ASSESSMENT/PLAN  Updated problem list and treatment plan: Diagnosis 1:  Neck pain    STG/LTGs have been met or progress has been made towards goals:  Yes (See Goal flow sheet completed today.)  Assessment of Progress: The patient has not returned to therapy. Current status is unknown.  Self Management Plans:  Patient has been instructed in a home treatment program.  Patient  has been instructed in self management of symptoms.    Recommendations:  This patient is to be discharged from therapy and continue their home treatment program.    Please refer to the daily flowsheet for treatment today, total treatment time and time spent performing 1:1 timed codes.

## 2018-06-09 ENCOUNTER — HEALTH MAINTENANCE LETTER (OUTPATIENT)
Age: 43
End: 2018-06-09

## 2019-12-09 ENCOUNTER — HEALTH MAINTENANCE LETTER (OUTPATIENT)
Age: 44
End: 2019-12-09

## 2020-03-15 ENCOUNTER — HEALTH MAINTENANCE LETTER (OUTPATIENT)
Age: 45
End: 2020-03-15

## 2020-08-25 ENCOUNTER — THERAPY VISIT (OUTPATIENT)
Dept: PHYSICAL THERAPY | Facility: CLINIC | Age: 45
End: 2020-08-25
Payer: COMMERCIAL

## 2020-08-25 DIAGNOSIS — M54.50 BILATERAL LOW BACK PAIN WITHOUT SCIATICA, UNSPECIFIED CHRONICITY: Primary | ICD-10-CM

## 2020-08-25 PROCEDURE — 99207 C NO CHARGE LOS: CPT | Mod: GP | Performed by: PHYSICAL THERAPIST

## 2020-08-25 NOTE — PROGRESS NOTES
Pt showed 10 min late and then did not want to be seen because her son's name was listed as her emergency contact. Even though we explained we would take his name and number off she declined the visit today. A few minutes later she said she did want to be seen but by then there was only 10 min left in visit and she was visibly upset so I re scheduled her.

## 2020-09-15 ENCOUNTER — THERAPY VISIT (OUTPATIENT)
Dept: PHYSICAL THERAPY | Facility: CLINIC | Age: 45
End: 2020-09-15
Payer: COMMERCIAL

## 2020-09-15 DIAGNOSIS — M25.512 CHRONIC LEFT SHOULDER PAIN: ICD-10-CM

## 2020-09-15 DIAGNOSIS — G89.29 CHRONIC LEFT SHOULDER PAIN: ICD-10-CM

## 2020-09-15 PROCEDURE — 97161 PT EVAL LOW COMPLEX 20 MIN: CPT | Mod: GP | Performed by: PHYSICAL THERAPIST

## 2020-09-15 PROCEDURE — 97110 THERAPEUTIC EXERCISES: CPT | Mod: GP | Performed by: PHYSICAL THERAPIST

## 2020-09-15 PROCEDURE — 97530 THERAPEUTIC ACTIVITIES: CPT | Mod: GP | Performed by: PHYSICAL THERAPIST
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
## 2020-10-01 ENCOUNTER — THERAPY VISIT (OUTPATIENT)
Dept: PHYSICAL THERAPY | Facility: CLINIC | Age: 45
End: 2020-10-01
Payer: COMMERCIAL

## 2020-10-01 DIAGNOSIS — G89.29 CHRONIC LEFT SHOULDER PAIN: ICD-10-CM

## 2020-10-01 DIAGNOSIS — M25.512 CHRONIC LEFT SHOULDER PAIN: ICD-10-CM

## 2020-10-01 PROCEDURE — 97110 THERAPEUTIC EXERCISES: CPT | Mod: GP | Performed by: PHYSICAL THERAPIST

## 2020-10-01 PROCEDURE — 97112 NEUROMUSCULAR REEDUCATION: CPT | Mod: GP | Performed by: PHYSICAL THERAPIST

## 2020-10-23 ENCOUNTER — THERAPY VISIT (OUTPATIENT)
Dept: PHYSICAL THERAPY | Facility: CLINIC | Age: 45
End: 2020-10-23
Payer: COMMERCIAL

## 2020-10-23 DIAGNOSIS — M25.512 CHRONIC LEFT SHOULDER PAIN: Primary | ICD-10-CM

## 2020-10-23 DIAGNOSIS — G89.29 CHRONIC LEFT SHOULDER PAIN: Primary | ICD-10-CM

## 2020-10-23 PROCEDURE — 97110 THERAPEUTIC EXERCISES: CPT | Mod: GP | Performed by: PHYSICAL THERAPIST

## 2020-10-23 PROCEDURE — 97530 THERAPEUTIC ACTIVITIES: CPT | Mod: GP | Performed by: PHYSICAL THERAPIST

## 2020-11-04 ENCOUNTER — THERAPY VISIT (OUTPATIENT)
Dept: PHYSICAL THERAPY | Facility: CLINIC | Age: 45
End: 2020-11-04
Payer: COMMERCIAL

## 2020-11-04 DIAGNOSIS — G89.29 CHRONIC LEFT SHOULDER PAIN: ICD-10-CM

## 2020-11-04 DIAGNOSIS — M25.512 CHRONIC LEFT SHOULDER PAIN: ICD-10-CM

## 2020-11-04 PROCEDURE — 97530 THERAPEUTIC ACTIVITIES: CPT | Mod: GP | Performed by: PHYSICAL THERAPY ASSISTANT

## 2020-11-04 PROCEDURE — 97110 THERAPEUTIC EXERCISES: CPT | Mod: GP | Performed by: PHYSICAL THERAPY ASSISTANT

## 2020-11-11 ENCOUNTER — THERAPY VISIT (OUTPATIENT)
Dept: PHYSICAL THERAPY | Facility: CLINIC | Age: 45
End: 2020-11-11
Payer: COMMERCIAL

## 2020-11-11 DIAGNOSIS — G89.29 CHRONIC LEFT SHOULDER PAIN: ICD-10-CM

## 2020-11-11 DIAGNOSIS — M25.512 CHRONIC LEFT SHOULDER PAIN: ICD-10-CM

## 2020-11-11 PROCEDURE — 97530 THERAPEUTIC ACTIVITIES: CPT | Mod: GP | Performed by: PHYSICAL THERAPY ASSISTANT

## 2020-11-11 PROCEDURE — 97110 THERAPEUTIC EXERCISES: CPT | Mod: GP | Performed by: PHYSICAL THERAPY ASSISTANT

## 2020-11-24 ENCOUNTER — THERAPY VISIT (OUTPATIENT)
Dept: PHYSICAL THERAPY | Facility: CLINIC | Age: 45
End: 2020-11-24
Payer: COMMERCIAL

## 2020-11-24 DIAGNOSIS — G89.29 CHRONIC LEFT SHOULDER PAIN: ICD-10-CM

## 2020-11-24 DIAGNOSIS — M25.512 CHRONIC LEFT SHOULDER PAIN: ICD-10-CM

## 2020-11-24 PROCEDURE — 97530 THERAPEUTIC ACTIVITIES: CPT | Mod: GP | Performed by: PHYSICAL THERAPY ASSISTANT

## 2020-11-24 PROCEDURE — 97110 THERAPEUTIC EXERCISES: CPT | Mod: GP | Performed by: PHYSICAL THERAPY ASSISTANT

## 2020-12-09 ENCOUNTER — THERAPY VISIT (OUTPATIENT)
Dept: PHYSICAL THERAPY | Facility: CLINIC | Age: 45
End: 2020-12-09
Payer: COMMERCIAL

## 2020-12-09 DIAGNOSIS — G89.29 CHRONIC LEFT SHOULDER PAIN: ICD-10-CM

## 2020-12-09 DIAGNOSIS — M25.512 CHRONIC LEFT SHOULDER PAIN: ICD-10-CM

## 2020-12-09 PROCEDURE — 97110 THERAPEUTIC EXERCISES: CPT | Mod: GP | Performed by: PHYSICAL THERAPY ASSISTANT

## 2020-12-09 PROCEDURE — 97530 THERAPEUTIC ACTIVITIES: CPT | Mod: GP | Performed by: PHYSICAL THERAPY ASSISTANT

## 2020-12-15 ENCOUNTER — THERAPY VISIT (OUTPATIENT)
Dept: PHYSICAL THERAPY | Facility: CLINIC | Age: 45
End: 2020-12-15
Payer: COMMERCIAL

## 2020-12-15 DIAGNOSIS — M25.512 CHRONIC LEFT SHOULDER PAIN: Primary | ICD-10-CM

## 2020-12-15 DIAGNOSIS — G89.29 CHRONIC LEFT SHOULDER PAIN: Primary | ICD-10-CM

## 2020-12-15 PROCEDURE — 97140 MANUAL THERAPY 1/> REGIONS: CPT | Mod: GP | Performed by: PHYSICAL THERAPIST

## 2020-12-15 PROCEDURE — 97530 THERAPEUTIC ACTIVITIES: CPT | Mod: GP | Performed by: PHYSICAL THERAPIST

## 2020-12-15 NOTE — PROGRESS NOTES
"DISCHARGE REPORT    Progress reporting period is from [id number] to [id number]      SUBJECTIVE  Subjective changes noted by patient:  Subjective: On Sunday she had a HA from the cold that she gets every year and it is nearly gone today.\" it was my whole head\" L shd pain with pushing up from bed is better and she is able to don bra without difficulty now. pulling covers up in bed produce yet..     Current Pain level: 0/10.     Initial Pain level: 10/10.   Changes in function:  Yes (See Goal flowsheet attached for changes in current functional level)  Adverse reaction to treatment or activity: None    OBJECTIVE  Objective: CSROM: ret no loss rot L tight min loss L CS R no loss SB L no loss R tight L CS mod loss ext min loss UEROM: wnl MMT UE's wnl. Sitting posture is remarkably improved. Post Rx her CSROM was wnl.      ASSESSMENT/PLAN  Updated problem list and treatment plan:   Diagnosis 1:  Neck and shd pain  Pain -  home program  Decreased joint mobility - home program  Decreased function - home program  STG/LTGs have been met or progress has been made towards goals:  Yes (See Goal flow sheet completed today.)  Assessment of Progress: The patient's condition is improving.  Self Management Plans:  Patient is independent in a home treatment program.  Patient is independent in self management of symptoms.    Chiqui continues to require the following intervention to meet STG and LTG's:  PT intervention is no longer required to meet STG/LTG.    Recommendations:  This patient is ready to be discharged from therapy and continue their home treatment program.  "

## 2021-01-14 ENCOUNTER — HEALTH MAINTENANCE LETTER (OUTPATIENT)
Age: 46
End: 2021-01-14

## 2021-08-28 ENCOUNTER — HEALTH MAINTENANCE LETTER (OUTPATIENT)
Age: 46
End: 2021-08-28

## 2021-10-23 ENCOUNTER — HEALTH MAINTENANCE LETTER (OUTPATIENT)
Age: 46
End: 2021-10-23

## 2021-12-19 ENCOUNTER — OFFICE VISIT (OUTPATIENT)
Dept: URGENT CARE | Facility: URGENT CARE | Age: 46
End: 2021-12-19
Payer: COMMERCIAL

## 2021-12-19 VITALS
OXYGEN SATURATION: 98 % | HEART RATE: 114 BPM | DIASTOLIC BLOOD PRESSURE: 133 MMHG | TEMPERATURE: 97.9 F | BODY MASS INDEX: 31.17 KG/M2 | WEIGHT: 181.6 LBS | SYSTOLIC BLOOD PRESSURE: 172 MMHG

## 2021-12-19 DIAGNOSIS — Z71.1 FEARED COMPLAINT WITHOUT DIAGNOSIS: Primary | ICD-10-CM

## 2021-12-19 DIAGNOSIS — R03.0 ELEVATED BLOOD PRESSURE READING: ICD-10-CM

## 2021-12-19 PROCEDURE — 99203 OFFICE O/P NEW LOW 30 MIN: CPT | Performed by: NURSE PRACTITIONER

## 2021-12-19 RX ORDER — MEDROXYPROGESTERONE ACETATE 150 MG/ML
150 INJECTION, SUSPENSION INTRAMUSCULAR
COMMUNITY
Start: 2021-04-28 | End: 2022-08-31

## 2021-12-19 RX ORDER — EPINEPHRINE 0.3 MG/.3ML
INJECTION SUBCUTANEOUS
COMMUNITY
Start: 2021-11-12

## 2021-12-19 NOTE — PROGRESS NOTES
SUBJECTIVE:   Chiqui Arias is a 46 year old female presenting with a chief complaint of   Chief Complaint   Patient presents with     Insect Bites     found tick on foot/sock       She is a new patient of [place].    Subjective  Chiqui Arias IS presenting to urgent care with complains of finding a tick in has sock.  She reports that she was at home last night and found a tick inside the right sock.  She is concerned that the tick might have been there for a long but does not have anywhere where she got bit.  She is requesting for antibiotics to prevent Lyme disease.  Her blood pressure is elevated today, patient reports that she reacts to blood pressure medications.    Review of Systems   Skin:        Found a tick in her sock   All other systems reviewed and are negative.      Past Medical History:   Diagnosis Date     Anxiety disorder      Celiac artery compression syndrome (H)      Chlamydia 12/00, 10/00    TREATED     Hypertension      Other chronic pain      Family History   Problem Relation Age of Onset     Thyroid Disease Mother      Heart Disease Father      Cancer Father         melanoma     Alzheimer Disease Paternal Grandmother      Current Outpatient Medications   Medication Sig Dispense Refill     medroxyPROGESTERone (DEPO-PROVERA) 150 MG/ML syringe Inject 150 mg into the muscle       chlorthalidone (HYGROTON) 25 MG tablet Take 25 mg by mouth daily        EPINEPHrine (ANY BX GENERIC EQUIV) 0.3 MG/0.3ML injection 2-pack        Social History     Tobacco Use     Smoking status: Current Some Day Smoker     Packs/day: 1.00     Types: Cigarettes     Smokeless tobacco: Never Used   Substance Use Topics     Alcohol use: Yes     Comment: 1-2 drinks per day       OBJECTIVE  BP (!) 172/133   Pulse 114   Temp 97.9  F (36.6  C) (Oral)   Wt 82.4 kg (181 lb 9.6 oz)   SpO2 98%   BMI 31.17 kg/m      Physical Exam  Vitals reviewed.   Constitutional:       General: She is not in acute distress.      Appearance: She is not diaphoretic.   HENT:      Head: Normocephalic and atraumatic.      Right Ear: Tympanic membrane, ear canal and external ear normal.      Left Ear: Tympanic membrane, ear canal and external ear normal.      Mouth/Throat:      Pharynx: No oropharyngeal exudate or posterior oropharyngeal erythema.      Tonsils: No tonsillar abscesses.   Eyes:      General:         Right eye: No discharge.         Left eye: No discharge.   Cardiovascular:      Rate and Rhythm: Normal rate and regular rhythm.      Heart sounds: Normal heart sounds.   Pulmonary:      Effort: Pulmonary effort is normal. No respiratory distress.      Breath sounds: Normal breath sounds. No wheezing or rales.   Musculoskeletal:         General: Normal range of motion.      Cervical back: Normal range of motion and neck supple.   Lymphadenopathy:      Cervical: No cervical adenopathy.   Skin:     General: Skin is warm and dry.      Findings: No bruising, erythema, lesion or rash.   Neurological:      Mental Status: She is alert.      Cranial Nerves: No cranial nerve deficit.         ASSESSMENT:      ICD-10-CM    1. Feared complaint without diagnosis  Z71.1    2. Elevated blood pressure reading  R03.0         PLAN:  My examination of her skin did not reveal any erythema, bruising, bug bites, rashes or any lesions.  The patient is concerned about the tick that was in her sock.  She reports that the tick did not bite her, but she is just concerned.  Her blood pressure is elevated, no taking any blood pressure medications at the moment.  I have advised to follow-up with her primary care provider, and patient agrees with plan.

## 2023-05-27 ENCOUNTER — HEALTH MAINTENANCE LETTER (OUTPATIENT)
Age: 48
End: 2023-05-27

## 2023-06-06 ENCOUNTER — OFFICE VISIT (OUTPATIENT)
Dept: DERMATOLOGY | Facility: CLINIC | Age: 48
End: 2023-06-06
Payer: COMMERCIAL

## 2023-06-06 DIAGNOSIS — Z80.8 FAMILY HISTORY OF MALIGNANT MELANOMA: ICD-10-CM

## 2023-06-06 DIAGNOSIS — L60.8 NAIL HEMORRHAGE: Primary | ICD-10-CM

## 2023-06-06 DIAGNOSIS — L57.8 ACTINIC SKIN DAMAGE: ICD-10-CM

## 2023-06-06 PROCEDURE — 99203 OFFICE O/P NEW LOW 30 MIN: CPT | Performed by: STUDENT IN AN ORGANIZED HEALTH CARE EDUCATION/TRAINING PROGRAM

## 2023-06-06 ASSESSMENT — PAIN SCALES - GENERAL: PAINLEVEL: NO PAIN (0)

## 2023-06-06 NOTE — NURSING NOTE
Dermatology Rooming Note    Chiqui Arias's goals for this visit include:   Chief Complaint   Patient presents with     Derm Problem     Spot on the right big toe.  Noticed it in the winter.       Nannette Isidro CMA
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
## 2023-12-31 ENCOUNTER — MYC MEDICAL ADVICE (OUTPATIENT)
Dept: DERMATOLOGY | Facility: CLINIC | Age: 48
End: 2023-12-31
Payer: COMMERCIAL

## 2024-03-17 ENCOUNTER — HEALTH MAINTENANCE LETTER (OUTPATIENT)
Age: 49
End: 2024-03-17

## 2025-02-07 ENCOUNTER — MEDICAL CORRESPONDENCE (OUTPATIENT)
Dept: HEALTH INFORMATION MANAGEMENT | Facility: CLINIC | Age: 50
End: 2025-02-07

## 2025-03-22 ENCOUNTER — HEALTH MAINTENANCE LETTER (OUTPATIENT)
Age: 50
End: 2025-03-22

## 2025-04-16 ENCOUNTER — MEDICAL CORRESPONDENCE (OUTPATIENT)
Dept: HEALTH INFORMATION MANAGEMENT | Facility: CLINIC | Age: 50
End: 2025-04-16
Payer: COMMERCIAL

## 2025-04-17 ENCOUNTER — TRANSCRIBE ORDERS (OUTPATIENT)
Dept: OTHER | Age: 50
End: 2025-04-17

## 2025-04-17 DIAGNOSIS — T14.8XXA EXCORIATION: Primary | ICD-10-CM

## 2025-04-21 ENCOUNTER — TELEPHONE (OUTPATIENT)
Dept: DERMATOLOGY | Facility: CLINIC | Age: 50
End: 2025-04-21
Payer: COMMERCIAL

## 2025-04-21 NOTE — TELEPHONE ENCOUNTER
[facility] Call Center    Phone Message    May a detailed message be left on voicemail: yes     Reason for Call: Other: Please review appointment for T14.8XXA (ICD-10-CM) - Excoriation as this diagnosis isn't in the protocols and there's no response on the back line. Please call patient to [facility] if she can't  see for this. Thank you.      Action Taken: Message routed to:  [facility] Derm    Travel Screening: Not Applicable

## 2025-05-04 NOTE — TELEPHONE ENCOUNTER
Chiqui called to set up a surgery date but was very anxious and not sure what she should do.  She at one point states she wants to schedule and then states that she is not sure.  I told Chiqui that if she still feels unsure and has more questions, she could come and see Dr. Eaton to review the results in person (instead of over the phone).  She said that would be best because she is so unsure and does not fully understand the surgery.  I have her scheduled for May 4th (she is thinking of doing the surgery in June after her son's graduation). Andreina Moore,     
Filed surgery orders in the pending file. Andreina Moore,     
Strong peripheral pulses

## (undated) DEVICE — SOL NACL 0.9% INJ 250ML BAG 2B1322Q

## (undated) DEVICE — DRAPE ISOLATION BAG 1003

## (undated) DEVICE — SU PROLENE 4-0 V-7DA 36" 8975H

## (undated) DEVICE — ESU GROUND PAD UNIVERSAL W/O CORD

## (undated) DEVICE — NDL 19GA 1.5"

## (undated) DEVICE — CATH TRAY FOLEY 16FR W/URINE METER STATLOCK 902916

## (undated) DEVICE — LINEN TOWEL PACK X5 5464

## (undated) DEVICE — SU PROLENE 4-0 V-5 36" 8935H

## (undated) DEVICE — GLOVE PROTEXIS W/NEU-THERA 7.5  2D73TE75

## (undated) DEVICE — DECANTER BAG 2002S

## (undated) DEVICE — COVER TABLE POLY 65X90" 8186

## (undated) DEVICE — SU SILK 2-0 TIE 24" SA75H

## (undated) DEVICE — SUCTION CANISTER MEDIVAC LINER 3000ML W/LID 65651-530

## (undated) DEVICE — DRAPE LAP W/ARMBOARD 29410

## (undated) DEVICE — SOL NACL 0.9% IRRIG 1000ML BOTTLE 07138-09

## (undated) DEVICE — SU VICRYL 3-0 SH 27" J316H

## (undated) DEVICE — SU VICRYL 2-0 CT-1 27" J339H

## (undated) DEVICE — PREP CHLORAPREP 26ML TINTED ORANGE  260815

## (undated) DEVICE — SYR 20ML SLIP TIP W/O NDL 302831

## (undated) DEVICE — SU PDS II 0 CTX 60" Z990G

## (undated) DEVICE — DRAPE IOBAN INCISE 36X23" 6651EZ

## (undated) DEVICE — NDL BLUNT 15GA 1.5"

## (undated) DEVICE — SU SILK 3-0 SH CR 8X30" C017D

## (undated) DEVICE — VESSEL LOOPS RED MAXI

## (undated) DEVICE — PACK AAA SBA15AAFS3

## (undated) DEVICE — SU SILK 3-0 TIE 24" SA74H

## (undated) DEVICE — SU PROLENE 3-0 SHDA 48" 8534H